# Patient Record
Sex: MALE | Race: BLACK OR AFRICAN AMERICAN | NOT HISPANIC OR LATINO | Employment: OTHER | URBAN - METROPOLITAN AREA
[De-identification: names, ages, dates, MRNs, and addresses within clinical notes are randomized per-mention and may not be internally consistent; named-entity substitution may affect disease eponyms.]

---

## 2020-03-18 ENCOUNTER — LAB VISIT (OUTPATIENT)
Dept: LAB | Facility: HOSPITAL | Age: 75
End: 2020-03-18
Attending: INTERNAL MEDICINE
Payer: MEDICARE

## 2020-03-18 DIAGNOSIS — R65.21 SPONTANEOUS ABORTION WITH SEPTIC SHOCK: ICD-10-CM

## 2020-03-18 DIAGNOSIS — J18.9 UNRESOLVED PNEUMONIA: ICD-10-CM

## 2020-03-18 PROCEDURE — U0002 COVID-19 LAB TEST NON-CDC: HCPCS

## 2020-03-20 LAB — SARS-COV-2 RNA RESP QL NAA+PROBE: NORMAL

## 2022-06-25 ENCOUNTER — OFFICE VISIT (OUTPATIENT)
Dept: URGENT CARE | Facility: CLINIC | Age: 77
End: 2022-06-25
Payer: MEDICARE

## 2022-06-25 VITALS
HEIGHT: 71 IN | DIASTOLIC BLOOD PRESSURE: 89 MMHG | HEART RATE: 71 BPM | BODY MASS INDEX: 26.6 KG/M2 | RESPIRATION RATE: 18 BRPM | WEIGHT: 190 LBS | OXYGEN SATURATION: 98 % | TEMPERATURE: 98 F | SYSTOLIC BLOOD PRESSURE: 153 MMHG

## 2022-06-25 DIAGNOSIS — U07.1 COVID-19 VIRUS DETECTED: ICD-10-CM

## 2022-06-25 DIAGNOSIS — R05.9 COUGH: Primary | ICD-10-CM

## 2022-06-25 DIAGNOSIS — U07.1 COVID-19 VIRUS INFECTION: ICD-10-CM

## 2022-06-25 LAB
CTP QC/QA: YES
SARS-COV-2 RDRP RESP QL NAA+PROBE: POSITIVE

## 2022-06-25 PROCEDURE — 1126F AMNT PAIN NOTED NONE PRSNT: CPT | Mod: CPTII,S$GLB,, | Performed by: FAMILY MEDICINE

## 2022-06-25 PROCEDURE — 3077F SYST BP >= 140 MM HG: CPT | Mod: CPTII,S$GLB,, | Performed by: FAMILY MEDICINE

## 2022-06-25 PROCEDURE — U0002 COVID-19 LAB TEST NON-CDC: HCPCS | Mod: QW,S$GLB,, | Performed by: FAMILY MEDICINE

## 2022-06-25 PROCEDURE — 3079F PR MOST RECENT DIASTOLIC BLOOD PRESSURE 80-89 MM HG: ICD-10-PCS | Mod: CPTII,S$GLB,, | Performed by: FAMILY MEDICINE

## 2022-06-25 PROCEDURE — 1159F PR MEDICATION LIST DOCUMENTED IN MEDICAL RECORD: ICD-10-PCS | Mod: CPTII,S$GLB,, | Performed by: FAMILY MEDICINE

## 2022-06-25 PROCEDURE — U0002: ICD-10-PCS | Mod: QW,S$GLB,, | Performed by: FAMILY MEDICINE

## 2022-06-25 PROCEDURE — 99214 OFFICE O/P EST MOD 30 MIN: CPT | Mod: S$GLB,,, | Performed by: FAMILY MEDICINE

## 2022-06-25 PROCEDURE — 1126F PR PAIN SEVERITY QUANTIFIED, NO PAIN PRESENT: ICD-10-PCS | Mod: CPTII,S$GLB,, | Performed by: FAMILY MEDICINE

## 2022-06-25 PROCEDURE — 1159F MED LIST DOCD IN RCRD: CPT | Mod: CPTII,S$GLB,, | Performed by: FAMILY MEDICINE

## 2022-06-25 PROCEDURE — 3079F DIAST BP 80-89 MM HG: CPT | Mod: CPTII,S$GLB,, | Performed by: FAMILY MEDICINE

## 2022-06-25 PROCEDURE — 99214 PR OFFICE/OUTPT VISIT, EST, LEVL IV, 30-39 MIN: ICD-10-PCS | Mod: S$GLB,,, | Performed by: FAMILY MEDICINE

## 2022-06-25 PROCEDURE — 3077F PR MOST RECENT SYSTOLIC BLOOD PRESSURE >= 140 MM HG: ICD-10-PCS | Mod: CPTII,S$GLB,, | Performed by: FAMILY MEDICINE

## 2022-06-25 RX ORDER — IRBESARTAN 300 MG/1
300 TABLET ORAL NIGHTLY
COMMUNITY

## 2022-06-25 RX ORDER — NAPROXEN 500 MG/1
500 TABLET ORAL 2 TIMES DAILY WITH MEALS
Qty: 20 TABLET | Refills: 0 | Status: SHIPPED | OUTPATIENT
Start: 2022-06-25

## 2022-06-25 RX ORDER — DEXTROMETHORPHAN HBR, GUAIFENESIN AND PSEUDOEPHEDRINE HCL 60; 380; 20 MG/1; MG/1; MG/1
1 TABLET ORAL EVERY 6 HOURS PRN
Qty: 20 TABLET | Refills: 0 | Status: SHIPPED | OUTPATIENT
Start: 2022-06-25

## 2022-06-25 RX ORDER — METFORMIN HYDROCHLORIDE 500 MG/1
500 TABLET ORAL 2 TIMES DAILY WITH MEALS
COMMUNITY

## 2022-06-25 RX ORDER — NAPROXEN SODIUM 220 MG/1
81 TABLET, FILM COATED ORAL DAILY
COMMUNITY

## 2022-06-25 RX ORDER — METOPROLOL TARTRATE 25 MG/1
25 TABLET, FILM COATED ORAL 2 TIMES DAILY
COMMUNITY

## 2022-06-25 RX ORDER — MULTIVIT WITH MINERALS/HERBS
1 TABLET ORAL DAILY
COMMUNITY

## 2022-06-25 RX ORDER — ATORVASTATIN CALCIUM 40 MG/1
40 TABLET, FILM COATED ORAL DAILY
COMMUNITY

## 2022-06-25 NOTE — LETTER
[address] [facility], [facility] [address] ? [place], [address] ? Phone [phone] ? Fax [phone]           Return to Work/School Status    Patient: Ramirez Cagle  YOB: 1945  Date: June 25, 2022      [facility] has adopted CDCs time-based return to work/school strategy for persons with confirmed or suspected COVID19. [facility] does not recommend using a test-based strategy for returning to work/school after COVID-19 infection. [facility] has reported prolonged positive test results without evidence of infectiousness. At this time, positive specimens capable of producing disease have not been isolated more than 9 days after onset of illness.   Symptomatic persons with confirmed COVID-19 or suspected COVID-19 can return to work/school after:    At least 1 days (24 hours) have passed since recovery defined as resolution of fever without the use of fever-reducing medications AND improvement in respiratory symptoms (e.g., cough, shortness of breath)    AND, at least 5 days have passed since symptoms first develop.  Asymptomatic persons with confirmed COVID-19 can return to work after:   At least 5 days have passed since the positive laboratory test and the person remains asymptomatic.  More information about the science behind the symptom-based return to work/school can be found at: https://www.cdc.gov/coronavirus/2019-ncov/community/asgwafrv-rrrodtdosel-lkrflurng.html    Sincerely,    Eleno Clayton MD

## 2022-06-25 NOTE — PROGRESS NOTES
"Subjective:       Patient ID: Ramirez Cagle is a 76 y.o. male.    Vitals:  height is 5' 11" (1.803 m) and weight is 86.2 kg (190 lb). His tympanic temperature is 97.7 °F (36.5 °C). His blood pressure is 153/89 (abnormal) and his pulse is 71. His respiration is 18 and oxygen saturation is 98%.     Chief Complaint: Cough (Symptoms started 3 days ago)    Cough  This is a recurrent problem. The current episode started in the past 7 days. The problem has been unchanged. The problem occurs constantly. The cough is non-productive. Nothing aggravates the symptoms. He has tried OTC cough suppressant for the symptoms. The treatment provided mild relief.       Constitution: Negative.   HENT: Negative.    Cardiovascular: Negative.    Eyes: Negative.    Respiratory: Positive for cough.    Gastrointestinal: Negative.    Endocrine: negative.   Genitourinary: Negative.    Musculoskeletal: Negative.    Skin: Negative.    Allergic/Immunologic: Negative.    Neurological: Negative.    Hematologic/Lymphatic: Negative.    Psychiatric/Behavioral: Negative.        Objective:      Physical Exam   Constitutional: He is oriented to person, place, and time. He appears well-developed. He is cooperative.  Non-toxic appearance. He does not appear ill. No distress.   HENT:   Head: Normocephalic and atraumatic.   Ears:   Right Ear: Hearing, tympanic membrane, external ear and ear canal normal.   Left Ear: Hearing, tympanic membrane, external ear and ear canal normal.   Nose: Nose normal. No mucosal edema, rhinorrhea or nasal deformity. No epistaxis. Right sinus exhibits no maxillary sinus tenderness and no frontal sinus tenderness. Left sinus exhibits no maxillary sinus tenderness and no frontal sinus tenderness.   Mouth/Throat: Uvula is midline, oropharynx is clear and moist and mucous membranes are normal. No trismus in the jaw. Normal dentition. No uvula swelling. No oropharyngeal exudate, posterior oropharyngeal edema or posterior " oropharyngeal erythema.   Eyes: Conjunctivae and lids are normal. No scleral icterus.   Neck: Trachea normal and phonation normal. Neck supple. No edema present. No erythema present. No neck rigidity present.   Cardiovascular: Normal rate, regular rhythm, normal heart sounds and normal pulses.   Pulmonary/Chest: Effort normal and breath sounds normal. No respiratory distress. He has no decreased breath sounds. He has no rhonchi.   Abdominal: Normal appearance.   Musculoskeletal: Normal range of motion.         General: No deformity. Normal range of motion.   Neurological: He is alert and oriented to person, place, and time. He exhibits normal muscle tone. Coordination normal.   Skin: Skin is warm, dry, intact, not diaphoretic and not pale.   Psychiatric: His speech is normal and behavior is normal. Judgment and thought content normal.   Nursing note and vitals reviewed.    Results for orders placed or performed in visit on 06/25/22   POCT COVID-19 Rapid Screening   Result Value Ref Range    POC Rapid COVID Positive (A) Negative     Acceptable Yes            Assessment:       1. Cough    2. COVID-19 virus infection          Plan:         Cough  -     POCT COVID-19 Rapid Screening    COVID-19 virus infection  -     pseudoephedrine-DM-guaiFENesin (POLY-VENT DM) 60- mg Tab; Take 1 tablet by mouth every 6 (six) hours as needed.  Dispense: 20 tablet; Refill: 0  -     naproxen (NAPROSYN) 500 MG tablet; Take 1 tablet (500 mg total) by mouth 2 (two) times daily with meals.  Dispense: 20 tablet; Refill: 0  -     nirmatrelvir-ritonavir 150 mg x 2- 100 mg copackaged tablets (EUA); Take 3 tablets by mouth 2 (two) times daily for 5 days. Each dose contains 2 nirmatrelvir (pink tablets) and 1 ritonavir (white tablet). Take all 3 tablets together  Dispense: 30 tablet; Refill: 0    Covid risk Score 3    Please drink plenty of fluids.  Please get plenty of rest.  Please return here or go to the Emergency  Department for any concerns or worsening of condition.    You have tested positive for COVID-19 today.  Please note that patients who test positive for COVID-19 are required by the CDC to undergo isolation for 5 days after their symptoms first began, followed by a 5 day period of strict mask wearing.  This isolation starts from the day you first developed symptoms, not the day of your positive test. For example, if your symptoms began on a Monday but tested positive on the following Wednesday, your 5-day isolation begins from that Monday, not the Wednesday you tested positive.  However, if you are asymptomatic (a person who does not have any symptoms) and COVID-19 positive, your 5-day isolation begins on the day you tested positive, regardless of exposure date.  Also, per the CDC guidelines, once your 5 days have passed, and you have not had fever greater than 100.4F in the last 24 hours without taking any fever reducers such as Tylenol (Acetaminophen) or Motrin (Ibuprofen), you may return to your normal activities including social distancing, wearing masks, and frequent handwashing - YOU DO NOT NEED ANOTHER TEST IN ORDER TO END YOUR QUARANTINE.     If you were prescribed a narcotic medication, do not drive or operate heavy equipment or machinery while taking these medications.    You were given a decongestant (RESCON or POLY VENT Dm).  If your insurance does not cover it or you cannot afford it, it is ok to use the over the counter products listed below.  If you do not have Hypertension or any history of palpitations, it is ok to take over the counter Sudafed or Mucinex D or Allegra-D or Claritin-D or Zyrtec-D.  If you do take one of the above, it is ok to combine that with plain over the counter Mucinex or Allegra or Claritin or Zyrtec.  If for example you are taking Zyrtec -D, you can combine that with Mucinex, but not Mucinex-D.  If you are taking Mucinex-D, you can combine that with plain Allegra or Claritin or  Zyrtec.   If you do have Hypertension or palpitations, it is safe to take Coricidin HBP for relief of sinus symptoms.    We recommend you take over the counter Flonase (Fluticasone) or another nasally inhaled steroid unless you are already taking one.  Nasal irrigation with a saline spray or Netti Pot like device per their directions is also recommended.  If not allergic, please take over the counter Tylenol (Acetaminophen) and/or Motrin (Ibuprofen) as directed for control of pain and/or fever.    We recommend Vitamin C (1000mg daily), Vitamin d3 (125mcg daily), and Zinc (100mg daily) to help combat the Coronavirus.    Robitussin DM 2 teas every 4 hours as needed for cough.  If you  smoke, please stop smoking.    Please follow up with your primary care doctor or specialist as needed.  Primary Doctor No  None      You must understand that you have received treatment at an Urgent Care facility only, and that you may be  released before all of your medical problems are known or treated. Urgent Care facilities are not equipped to  handle life threatening emergencies. It is recommended that you seek care at an Emergency Department for  further evaluation of worsening or concerning symptoms, or possibly life threatening conditions as  Discussed.

## 2022-06-25 NOTE — PATIENT INSTRUCTIONS
Please drink plenty of fluids.  Please get plenty of rest.  Please return here or go to the Emergency Department for any concerns or worsening of condition.    You have tested positive for COVID-19 today.  Please note that patients who test positive for COVID-19 are required by the CDC to undergo isolation for 5 days after their symptoms first began, followed by a 5 day period of strict Mask wearing.  This isolation starts from the day you first developed symptoms, not the day of your positive test. For example, if your symptoms began on a Monday but tested positive on the following Wednesday, your 5 day isolation begins from that Monday, not the Wednesday you tested positive.  However, if you are asymptomatic (a person who does not have any symptoms) and COVID-19 positive, your 5 day isolation begins on the day you tested positive, regardless of exposure date.  Also, per the CDC guidelines, once your 5 days have passed, and you have not had fever greater than 100.4F in the last 24 hours without taking any fever reducers such as Tylenol (Acetaminophen) or Motrin (Ibuprofen), you may return to your normal activities including social distancing, wearing masks, and frequent handwashing - YOU DO NOT NEED ANOTHER TEST IN ORDER TO END YOUR QUARANTINE.     If you were prescribed a narcotic medication, do not drive or operate heavy equipment or machinery while taking these medications.    You were given a decongestant (RESCON or POLY VENT Dm).  If your insurance does not cover it or you cannot afford it, it is ok to use the over the counter products listed below.  If you do not have Hypertension or any history of palpitations, it is ok to take over the counter Sudafed or Mucinex D or Allegra-D or Claritin-D or Zyrtec-D.  If you do take one of the above, it is ok to combine that with plain over the counter Mucinex or Allegra or Claritin or Zyrtec.  If for example you are taking Zyrtec -D, you can combine that with Mucinex,  but not Mucinex-D.  If you are taking Mucinex-D, you can combine that with plain Allegra or Claritin or Zyrtec.   If you do have Hypertension or palpitations, it is safe to take Coricidin HBP for relief of sinus symptoms.    We recommend you take over the counter Flonase (Fluticasone) or another nasally inhaled steroid unless you are already taking one.  Nasal irrigation with a saline spray or Netti Pot like device per their directions is also recommended.  If not allergic, please take over the counter Tylenol (Acetaminophen) and/or Motrin (Ibuprofen) as directed for control of pain and/or fever.    We recommend Vitamin C (1000mg daily), Vitamin d3 (125mcg daily), and Zinc (100mg daily) to help combat the Coronavirus.    Robitussin DM 2 teas every 4 hours as needed for cough.  If you  smoke, please stop smoking.    Please follow up with your primary care doctor or specialist as needed.  Primary Doctor No  None    Covid risk Score 3    You must understand that you have received treatment at an Urgent Care facility only, and that you may be  released before all of your medical problems are known or treated. Urgent Care facilities are not equipped to  handle life threatening emergencies. It is recommended that you seek care at an Emergency Department for  further evaluation of worsening or concerning symptoms, or possibly life threatening conditions as  Discussed.    Instructions for Patients with Confirmed or Suspected COVID-19    If you are awaiting your test result, you will either be called or it will be released to the patient portal.  If you have any questions about your test, please visit [email or web address] or call our COVID-19 information line at [phone].      Instructions for non-hospitalized or discharged patients with confirmed or suspected COVID-19:      Stay home except to get medical care.   Separate yourself from other people and animals in your home.   Call ahead before visiting your  doctor.   Wear a face mask.   Cover your coughs and sneezes.   Clean your hands often.   Avoid sharing personal household items.   Clean all high-touch surfaces every day.   Monitor your symptoms. Seek prompt medical attention if your illness is worsening (e.g., difficulty breathing). Before seeking care, call your healthcare provider.   If you have a medical emergency and must call 911, notify the dispatcher that you have or are being evaluated for COVID-19. If possible, put on a face mask before emergency medical services arrive.   Use the following symptom-based strategy to return to normal activity following a suspected or confirmed case of COVID-19. Continue isolation until:   At least 1 days (24 hours) have passed since recovery defined as resolution of fever without the use of fever-reducing medications and improvement in respiratory symptoms (e.g. cough, shortness of breath), and   At least 5 days have passed since the first positive test.       As one of the next steps, you will receive a call or text from the [place] Department of Health ([facility]) COVID-19 Tracing Team. See the contact information below so you know not to ignore the health departments call. It is important that you contact them back immediately so they can help.     Contact Tracer Number:  [phone]  Caller ID for most carriers: [place] Dept Health    What is contact tracing?  Contact tracing is a process that helps identify everyone who has been in close contact with an infected person. Contact tracers let those people know they may have been exposed and guide them on next steps. Confidentiality is important for everyone; no one will be told who may have exposed them to the virus.  Your involvement is important. The more we know about where and how this virus is spreading, the better chance we have at stopping it from spreading further.  What does exposure mean?  Exposure means you have been within 6 feet for more than 15 minutes with a  person who has or had COVID-19.  What kind of questions do the contact tracers ask?  A contact tracer will confirm your basic contact information including name, address, phone number, and next of kin, as well as asking about any symptoms you may have had. Theyll also ask you how you think you may have gotten sick, such as places where you may have been exposed to the virus, and people you were with. Those names will never be shared with anyone outside of that call, and will only be used to help trace and stop the spread of the virus.   I have privacy concerns. How will the state use my information?  Your privacy about your health is important. All calls are completed using call centers that use the appropriate health privacy protection measures (HIPAA compliance), meaning that your patient information is safe. No one will ever ask you any questions related to immigration status. Your health comes first.   Do I have to participate?  You do not have to participate, but we strongly encourage you to. Contact tracing can help us catch and control new outbreaks as theyre developing to keep your friends and family safe.   What if I dont hear from anyone?  If you dont receive a call within 24 hours, you can call the number above right away to inquire about your status. That line is open from 8:00 am - 8:00 p.m., 7 days a week.  Contact tracing saves lives! Together, we have the power to beat this virus and keep our loved ones and neighbors safe.       Instructions for household members, intimate partners and caregivers in a non-healthcare setting of a patient with confirmed or suspected COVID-19:        Close contacts should monitor their health and call their healthcare provider right away if they develop symptoms suggestive of COVID-19 (e.g., fever, cough, shortness of breath).   Stay home except to get medical care. Separate yourself from other people and animals in the home.  Monitor the patients symptoms. If the  patient is getting sicker, call his or her healthcare provider. If the patient has a medical emergency and you need to call 911, notify the dispatch personnel that the patient has or is being evaluated for COVID-19.   Wear a facemask when around other people such as sharing a room or vehicle and before entering a healthcare provider's office.  Cover coughs and sneezes with a tissue. Throw used tissues in a lined trash can immediately and wash hands.  Clean hands often with soap and water for at least 20 seconds or with an alcohol-based hand , rubbing hands together until they feel dry. Avoid touching your eyes, nose, and mouth with unwashed hands.  Clean all high-touch; surfaces every day, including counters, tabletops, doorknobs, bathroom fixtures, toilets, phones, keyboards, tablets, bedside tables, etc. Use a household cleaning spray or wipe according to label instructions.  Avoid sharing personal household items such as dishes, drinking glasses, cups, towels, bedding, etc. After these items are used, they should be washed thoroughly with soap and water.  Continue isolation until:  At least 1 days (72 hours) have passed since recovery defined as resolution of fever without the use of fever-reducing medications and improvement in respiratory symptoms (e.g. cough, shortness of breath), and   At least 5 days have passed since the patients first positive test.    https://www.cdc.gov/coronavirus/2019-ncov/your-health/index.htm

## 2022-06-29 ENCOUNTER — TELEPHONE (OUTPATIENT)
Dept: URGENT CARE | Facility: CLINIC | Age: 77
End: 2022-06-29
Payer: MEDICARE

## 2022-06-29 NOTE — TELEPHONE ENCOUNTER
Called back patient for follow up visit 4 days ago for Covid infection.  Patient states he is feeling better, symptoms improving with treatment.  Recommended continuing medications as needed and finish isolation period.  Follow up with us if symptoms persist or worsen.

## 2022-11-29 ENCOUNTER — CLINICAL SUPPORT (OUTPATIENT)
Dept: CARDIOLOGY | Facility: HOSPITAL | Age: 77
End: 2022-11-29
Attending: INTERNAL MEDICINE
Payer: MEDICARE

## 2022-11-29 VITALS
DIASTOLIC BLOOD PRESSURE: 89 MMHG | BODY MASS INDEX: 25.77 KG/M2 | WEIGHT: 180 LBS | HEIGHT: 70 IN | SYSTOLIC BLOOD PRESSURE: 153 MMHG

## 2022-11-29 DIAGNOSIS — I50.22 CHRONIC SYSTOLIC HEART FAILURE: ICD-10-CM

## 2022-11-29 LAB
AORTIC ROOT ANNULUS: 4.52 CM
AORTIC VALVE CUSP SEPERATION: 1.46 CM
ASCENDING AORTA: 4.1 CM
AV INDEX (PROSTH): 0.76
AV MEAN GRADIENT: 2 MMHG
AV PEAK GRADIENT: 4 MMHG
AV VALVE AREA: 2.96 CM2
AV VELOCITY RATIO: 0.67
BSA FOR ECHO PROCEDURE: 2.01 M2
CV ECHO LV RWT: 0.39 CM
DOP CALC AO PEAK VEL: 1.05 M/S
DOP CALC AO VTI: 23.7 CM
DOP CALC LVOT AREA: 3.9 CM2
DOP CALC LVOT DIAMETER: 2.23 CM
DOP CALC LVOT PEAK VEL: 0.7 M/S
DOP CALC LVOT STROKE VOLUME: 70.27 CM3
DOP CALCLVOT PEAK VEL VTI: 18 CM
E WAVE DECELERATION TIME: 318.59 MSEC
E/A RATIO: 3.19
ECHO LV POSTERIOR WALL: 1.04 CM (ref 0.6–1.1)
EJECTION FRACTION: 50 %
FRACTIONAL SHORTENING: 26 % (ref 28–44)
INTERVENTRICULAR SEPTUM: 1.72 CM (ref 0.6–1.1)
IVC DIAMETER: 1.95 CM
IVRT: 110.73 MSEC
LA MAJOR: 6.32 CM
LEFT ATRIUM SIZE: 4.4 CM
LEFT INTERNAL DIMENSION IN SYSTOLE: 3.99 CM (ref 2.1–4)
LEFT VENTRICLE DIASTOLIC VOLUME INDEX: 69.35 ML/M2
LEFT VENTRICLE DIASTOLIC VOLUME: 138.69 ML
LEFT VENTRICLE MASS INDEX: 159 G/M2
LEFT VENTRICLE SYSTOLIC VOLUME INDEX: 34.8 ML/M2
LEFT VENTRICLE SYSTOLIC VOLUME: 69.63 ML
LEFT VENTRICULAR INTERNAL DIMENSION IN DIASTOLE: 5.36 CM (ref 3.5–6)
LEFT VENTRICULAR MASS: 317.92 G
LVOT MG: 0.85 MMHG
LVOT MV: 0.41 CM/S
MV PEAK A VEL: 0.32 M/S
MV PEAK E VEL: 1.02 M/S
MV STENOSIS PRESSURE HALF TIME: 92.39 MS
MV VALVE AREA P 1/2 METHOD: 2.38 CM2
PISA TR MAX VEL: 3.01 M/S
PV MV: 0.49 M/S
PV PEAK VELOCITY: 0.76 CM/S
RA MAJOR: 5.67 CM
RA PRESSURE: 3 MMHG
RA WIDTH: 5.2 CM
RIGHT VENTRICULAR END-DIASTOLIC DIMENSION: 3.01 CM
TR MAX PG: 36 MMHG
TV PEAK E VEL: 0.7 M/S
TV REST PULMONARY ARTERY PRESSURE: 39 MMHG

## 2022-11-29 PROCEDURE — 93306 TTE W/DOPPLER COMPLETE: CPT

## 2023-01-18 ENCOUNTER — HOSPITAL ENCOUNTER (OUTPATIENT)
Facility: HOSPITAL | Age: 78
Discharge: HOME | End: 2023-01-21
Attending: EMERGENCY MEDICINE | Admitting: INTERNAL MEDICINE
Payer: MEDICARE

## 2023-01-18 DIAGNOSIS — I10 PRIMARY HYPERTENSION: ICD-10-CM

## 2023-01-18 DIAGNOSIS — N39.0 URINARY TRACT INFECTION WITHOUT HEMATURIA, SITE UNSPECIFIED: ICD-10-CM

## 2023-01-18 DIAGNOSIS — G93.41 METABOLIC ENCEPHALOPATHY: Primary | ICD-10-CM

## 2023-01-18 DIAGNOSIS — I10 MALIGNANT HYPERTENSION: ICD-10-CM

## 2023-01-18 PROBLEM — G93.40 ACUTE ENCEPHALOPATHY: Status: ACTIVE | Noted: 2023-01-18

## 2023-01-18 LAB
ABO + RH BLD: NORMAL
ALBUMIN SERPL BCP-MCNC: 3.3 G/DL (ref 3.5–5.2)
ALP SERPL-CCNC: 61 U/L (ref 55–135)
ALT SERPL W/O P-5'-P-CCNC: 30 U/L (ref 10–44)
AMPHET+METHAMPHET UR QL: NEGATIVE
ANION GAP SERPL CALC-SCNC: 4 MMOL/L (ref 8–16)
AST SERPL-CCNC: 32 U/L (ref 10–40)
BACTERIA #/AREA URNS HPF: ABNORMAL /HPF
BARBITURATES UR QL SCN>200 NG/ML: NEGATIVE
BASOPHILS # BLD AUTO: 0.01 K/UL (ref 0–0.2)
BASOPHILS NFR BLD: 0.1 % (ref 0–1.9)
BENZODIAZ UR QL SCN>200 NG/ML: NEGATIVE
BILIRUB SERPL-MCNC: 0.7 MG/DL (ref 0.1–1)
BILIRUB UR QL STRIP: NEGATIVE
BLD GP AB SCN CELLS X3 SERPL QL: NORMAL
BUN SERPL-MCNC: 19 MG/DL (ref 8–23)
BZE UR QL SCN: NEGATIVE
CALCIUM SERPL-MCNC: 7.9 MG/DL (ref 8.7–10.5)
CANNABINOIDS UR QL SCN: NEGATIVE
CHLORIDE SERPL-SCNC: 107 MMOL/L (ref 95–110)
CHOLEST SERPL-MCNC: 117 MG/DL (ref 120–199)
CHOLEST/HDLC SERPL: 2.9 {RATIO} (ref 2–5)
CLARITY UR: CLEAR
CO2 SERPL-SCNC: 29 MMOL/L (ref 23–29)
COLOR UR: YELLOW
CREAT SERPL-MCNC: 1.6 MG/DL (ref 0.5–1.4)
CREAT UR-MCNC: 60.9 MG/DL (ref 23–375)
CTP QC/QA: YES
DIFFERENTIAL METHOD: ABNORMAL
EOSINOPHIL # BLD AUTO: 0.1 K/UL (ref 0–0.5)
EOSINOPHIL NFR BLD: 1.1 % (ref 0–8)
ERYTHROCYTE [DISTWIDTH] IN BLOOD BY AUTOMATED COUNT: 14.7 % (ref 11.5–14.5)
EST. GFR  (NO RACE VARIABLE): 44.4 ML/MIN/1.73 M^2
ESTIMATED AVG GLUCOSE: 146 MG/DL (ref 68–131)
ETHANOL SERPL-MCNC: <3 MG/DL
GLUCOSE SERPL-MCNC: 197 MG/DL (ref 70–110)
GLUCOSE UR QL STRIP: ABNORMAL
HBA1C MFR BLD: 6.7 % (ref 4–5.6)
HCT VFR BLD AUTO: 34.9 % (ref 40–54)
HDLC SERPL-MCNC: 40 MG/DL (ref 40–75)
HDLC SERPL: 34.2 % (ref 20–50)
HGB BLD-MCNC: 11.4 G/DL (ref 14–18)
HGB UR QL STRIP: ABNORMAL
HYALINE CASTS #/AREA URNS LPF: 0.8 /LPF
IMM GRANULOCYTES # BLD AUTO: 0.02 K/UL (ref 0–0.04)
IMM GRANULOCYTES NFR BLD AUTO: 0.3 % (ref 0–0.5)
INR PPP: 1.2 (ref 0.8–1.2)
KETONES UR QL STRIP: NEGATIVE
LDLC SERPL CALC-MCNC: 67.2 MG/DL (ref 63–159)
LEUKOCYTE ESTERASE UR QL STRIP: NEGATIVE
LYMPHOCYTES # BLD AUTO: 1.1 K/UL (ref 1–4.8)
LYMPHOCYTES NFR BLD: 16 % (ref 18–48)
MCH RBC QN AUTO: 30.5 PG (ref 27–31)
MCHC RBC AUTO-ENTMCNC: 32.7 G/DL (ref 32–36)
MCV RBC AUTO: 93 FL (ref 82–98)
METHADONE UR QL SCN>300 NG/ML: NEGATIVE
MICROSCOPIC COMMENT: ABNORMAL
MONOCYTES # BLD AUTO: 0.5 K/UL (ref 0.3–1)
MONOCYTES NFR BLD: 7.1 % (ref 4–15)
NEUTROPHILS # BLD AUTO: 5.3 K/UL (ref 1.8–7.7)
NEUTROPHILS NFR BLD: 75.4 % (ref 38–73)
NITRITE UR QL STRIP: NEGATIVE
NONHDLC SERPL-MCNC: 77 MG/DL
NRBC BLD-RTO: 0 /100 WBC
OPIATES UR QL SCN: NEGATIVE
PCP UR QL SCN>25 NG/ML: NEGATIVE
PH UR STRIP: 7 [PH] (ref 5–8)
PLATELET # BLD AUTO: 123 K/UL (ref 150–450)
PMV BLD AUTO: 11.6 FL (ref 9.2–12.9)
POCT GLUCOSE: 152 MG/DL (ref 70–110)
POCT GLUCOSE: 169 MG/DL (ref 70–110)
POCT GLUCOSE: 195 MG/DL (ref 70–110)
POTASSIUM SERPL-SCNC: 4 MMOL/L (ref 3.5–5.1)
PROT SERPL-MCNC: 7.5 G/DL (ref 6–8.4)
PROT UR QL STRIP: ABNORMAL
PROTHROMBIN TIME: 12.9 SEC (ref 9–12.5)
RBC # BLD AUTO: 3.74 M/UL (ref 4.6–6.2)
RBC #/AREA URNS HPF: 5 /HPF (ref 0–4)
SARS-COV-2 RDRP RESP QL NAA+PROBE: NEGATIVE
SODIUM SERPL-SCNC: 140 MMOL/L (ref 136–145)
SP GR UR STRIP: 1.01 (ref 1–1.03)
SQUAMOUS #/AREA URNS HPF: 1 /HPF
TOXICOLOGY INFORMATION: NORMAL
TRIGL SERPL-MCNC: 49 MG/DL (ref 30–150)
TSH SERPL DL<=0.005 MIU/L-ACNC: 3.39 UIU/ML (ref 0.4–4)
URN SPEC COLLECT METH UR: ABNORMAL
UROBILINOGEN UR STRIP-ACNC: NEGATIVE EU/DL
WBC # BLD AUTO: 7.01 K/UL (ref 3.9–12.7)
WBC #/AREA URNS HPF: 9 /HPF (ref 0–5)

## 2023-01-18 PROCEDURE — 82077 ASSAY SPEC XCP UR&BREATH IA: CPT | Performed by: EMERGENCY MEDICINE

## 2023-01-18 PROCEDURE — 25000003 PHARM REV CODE 250: Performed by: EMERGENCY MEDICINE

## 2023-01-18 PROCEDURE — 85025 COMPLETE CBC W/AUTO DIFF WBC: CPT | Performed by: EMERGENCY MEDICINE

## 2023-01-18 PROCEDURE — 87635 SARS-COV-2 COVID-19 AMP PRB: CPT | Performed by: EMERGENCY MEDICINE

## 2023-01-18 PROCEDURE — 63600175 PHARM REV CODE 636 W HCPCS: Performed by: EMERGENCY MEDICINE

## 2023-01-18 PROCEDURE — G0426 INPT/ED TELECONSULT50: HCPCS | Mod: 95,,, | Performed by: PSYCHIATRY & NEUROLOGY

## 2023-01-18 PROCEDURE — 96365 THER/PROPH/DIAG IV INF INIT: CPT

## 2023-01-18 PROCEDURE — 84443 ASSAY THYROID STIM HORMONE: CPT | Performed by: EMERGENCY MEDICINE

## 2023-01-18 PROCEDURE — 94761 N-INVAS EAR/PLS OXIMETRY MLT: CPT

## 2023-01-18 PROCEDURE — G0378 HOSPITAL OBSERVATION PER HR: HCPCS

## 2023-01-18 PROCEDURE — 86900 BLOOD TYPING SEROLOGIC ABO: CPT | Performed by: EMERGENCY MEDICINE

## 2023-01-18 PROCEDURE — 99285 EMERGENCY DEPT VISIT HI MDM: CPT | Mod: 25

## 2023-01-18 PROCEDURE — 85610 PROTHROMBIN TIME: CPT | Performed by: EMERGENCY MEDICINE

## 2023-01-18 PROCEDURE — 27000221 HC OXYGEN, UP TO 24 HOURS

## 2023-01-18 PROCEDURE — 80061 LIPID PANEL: CPT | Performed by: EMERGENCY MEDICINE

## 2023-01-18 PROCEDURE — G0378 HOSPITAL OBSERVATION PER HR: HCPCS | Mod: OBSCO

## 2023-01-18 PROCEDURE — 93010 EKG 12-LEAD: ICD-10-PCS | Mod: ,,, | Performed by: INTERNAL MEDICINE

## 2023-01-18 PROCEDURE — 99900035 HC TECH TIME PER 15 MIN (STAT)

## 2023-01-18 PROCEDURE — 99900031 HC PATIENT EDUCATION (STAT)

## 2023-01-18 PROCEDURE — 81000 URINALYSIS NONAUTO W/SCOPE: CPT | Mod: 59 | Performed by: EMERGENCY MEDICINE

## 2023-01-18 PROCEDURE — 93010 ELECTROCARDIOGRAM REPORT: CPT | Mod: ,,, | Performed by: INTERNAL MEDICINE

## 2023-01-18 PROCEDURE — 36415 COLL VENOUS BLD VENIPUNCTURE: CPT | Performed by: EMERGENCY MEDICINE

## 2023-01-18 PROCEDURE — 80307 DRUG TEST PRSMV CHEM ANLYZR: CPT | Performed by: EMERGENCY MEDICINE

## 2023-01-18 PROCEDURE — 93005 ELECTROCARDIOGRAM TRACING: CPT

## 2023-01-18 PROCEDURE — 96360 HYDRATION IV INFUSION INIT: CPT | Mod: 59

## 2023-01-18 PROCEDURE — 82962 GLUCOSE BLOOD TEST: CPT

## 2023-01-18 PROCEDURE — 80053 COMPREHEN METABOLIC PANEL: CPT | Performed by: EMERGENCY MEDICINE

## 2023-01-18 PROCEDURE — 25500020 PHARM REV CODE 255: Performed by: EMERGENCY MEDICINE

## 2023-01-18 PROCEDURE — G0426 PR INPT TELEHEALTH CONSULT 50M: ICD-10-PCS | Mod: 95,,, | Performed by: PSYCHIATRY & NEUROLOGY

## 2023-01-18 PROCEDURE — 83036 HEMOGLOBIN GLYCOSYLATED A1C: CPT | Performed by: EMERGENCY MEDICINE

## 2023-01-18 PROCEDURE — 96367 TX/PROPH/DG ADDL SEQ IV INF: CPT

## 2023-01-18 RX ORDER — IBUPROFEN 200 MG
24 TABLET ORAL
Status: DISCONTINUED | OUTPATIENT
Start: 2023-01-18 | End: 2023-01-21 | Stop reason: HOSPADM

## 2023-01-18 RX ORDER — ONDANSETRON 2 MG/ML
4 INJECTION INTRAMUSCULAR; INTRAVENOUS EVERY 8 HOURS PRN
Status: DISCONTINUED | OUTPATIENT
Start: 2023-01-18 | End: 2023-01-21 | Stop reason: HOSPADM

## 2023-01-18 RX ORDER — GLUCAGON 1 MG
1 KIT INJECTION
Status: DISCONTINUED | OUTPATIENT
Start: 2023-01-18 | End: 2023-01-21 | Stop reason: HOSPADM

## 2023-01-18 RX ORDER — SODIUM CHLORIDE 0.9 % (FLUSH) 0.9 %
10 SYRINGE (ML) INJECTION
Status: DISCONTINUED | OUTPATIENT
Start: 2023-01-18 | End: 2023-01-21 | Stop reason: HOSPADM

## 2023-01-18 RX ORDER — INSULIN ASPART 100 [IU]/ML
0-5 INJECTION, SOLUTION INTRAVENOUS; SUBCUTANEOUS
Status: DISCONTINUED | OUTPATIENT
Start: 2023-01-18 | End: 2023-01-21 | Stop reason: HOSPADM

## 2023-01-18 RX ORDER — LOSARTAN POTASSIUM 50 MG/1
100 TABLET ORAL DAILY
Status: DISCONTINUED | OUTPATIENT
Start: 2023-01-19 | End: 2023-01-21 | Stop reason: HOSPADM

## 2023-01-18 RX ORDER — METOPROLOL TARTRATE 25 MG/1
25 TABLET, FILM COATED ORAL 2 TIMES DAILY
Status: DISCONTINUED | OUTPATIENT
Start: 2023-01-18 | End: 2023-01-21 | Stop reason: HOSPADM

## 2023-01-18 RX ORDER — ACETAMINOPHEN 325 MG/1
650 TABLET ORAL EVERY 8 HOURS PRN
Status: DISCONTINUED | OUTPATIENT
Start: 2023-01-18 | End: 2023-01-21 | Stop reason: HOSPADM

## 2023-01-18 RX ORDER — TALC
6 POWDER (GRAM) TOPICAL NIGHTLY PRN
Status: DISCONTINUED | OUTPATIENT
Start: 2023-01-18 | End: 2023-01-21 | Stop reason: HOSPADM

## 2023-01-18 RX ORDER — NICARDIPINE HYDROCHLORIDE 0.2 MG/ML
0-15 INJECTION INTRAVENOUS CONTINUOUS
Status: DISCONTINUED | OUTPATIENT
Start: 2023-01-18 | End: 2023-01-18

## 2023-01-18 RX ORDER — IBUPROFEN 200 MG
16 TABLET ORAL
Status: DISCONTINUED | OUTPATIENT
Start: 2023-01-18 | End: 2023-01-21 | Stop reason: HOSPADM

## 2023-01-18 RX ORDER — SODIUM CHLORIDE 9 MG/ML
INJECTION, SOLUTION INTRAVENOUS
Status: COMPLETED | OUTPATIENT
Start: 2023-01-18 | End: 2023-01-18

## 2023-01-18 RX ADMIN — NICARDIPINE HYDROCHLORIDE 5 MG/HR: 0.2 INJECTION, SOLUTION INTRAVENOUS at 02:01

## 2023-01-18 RX ADMIN — SODIUM CHLORIDE: 9 INJECTION, SOLUTION INTRAVENOUS at 05:01

## 2023-01-18 RX ADMIN — IOHEXOL 75 ML: 350 INJECTION, SOLUTION INTRAVENOUS at 02:01

## 2023-01-18 RX ADMIN — CEFTRIAXONE SODIUM 1 G: 1 INJECTION, POWDER, FOR SOLUTION INTRAMUSCULAR; INTRAVENOUS at 03:01

## 2023-01-18 RX ADMIN — METOPROLOL TARTRATE 25 MG: 25 TABLET, FILM COATED ORAL at 09:01

## 2023-01-18 RX ADMIN — SODIUM CHLORIDE 1000 ML: 9 INJECTION, SOLUTION INTRAVENOUS at 03:01

## 2023-01-18 NOTE — CONSULTS
[facility]  Vascular Neurology  Comprehensive Stroke Center  TeleVascular Neurology Acute Consultation Note      Consult to Telemedicine - Acute Stroke  Consult performed by: Bong Iverson MD  Consult ordered by: Ab Mccann MD        Consulting Provider: AB MCCANN.  Current Providers  No providers found    Patient Location:  [facility] EMERGENCY DEPARTMENT Emergency Department  [facility] hospital nurse at bedside with patient assisting consultant.     Patient information was obtained from patient.         Assessment/Plan:       Diagnoses:   Acute encephalopathy  Global confusion and weakness, reduced alertness.  No focal findings on exam, low suspicion for brainstem infarct however plan on CTA H&N before treatment as HTN emergency.  No hemorrhage on CT scan.   OK at this time to continue apixaban for secondary stroke prevention.        STROKE DOCUMENTATION     Acute Stroke Times:   Acute Stroke Times   Last Known Normal Time: 1100  Stroke Team Called Time: 1411  Stroke Team Arrival Time: 1421  Thrombolytic Therapy Recommended: No    NIH Scale:  1a. Level of Consciousness: 1-->Not alert, but arousable by minor stimulation to obey, answer, or respond  1b. LOC Questions: 0-->Answers both questions correctly  1c. LOC Commands: 0-->Performs both tasks correctly  2. Best Gaze: 0-->Normal  3. Visual: 0-->No visual loss  4. Facial Palsy: 0-->Normal symmetrical movements  5a. Motor Arm, Left: 0-->No drift, limb holds 90 (or 45) degrees for full 10 secs  5b. Motor Arm, Right: 0-->No drift, limb holds 90 (or 45) degrees for full 10 secs  6a. Motor Leg, Left: 0-->No drift, leg holds 30 degree position for full 5 secs  6b. Motor Leg, Right: 0-->No drift, leg holds 30 degree position for full 5 secs  7. Limb Ataxia: 0-->Absent  8. Sensory: 0-->Normal, no sensory loss  9. Best Language: 0-->No aphasia, normal  10. Dysarthria: 0-->Normal  11. Extinction and Inattention (formerly  "Neglect): 0-->No abnormality  Total (NIH Stroke Scale): 1     Modified Terry    [place] Coma Scale:    ABCD2 Score:    [id number] Score:   HAS -BLED Score:   ICH Score:   Hunt & Lambert Classification:       Blood pressure (!) 230/108, pulse (!) 44, resp. rate 16, height 5' 10" (1.778 m), weight 90.5 kg (199 lb 8 oz), SpO2 95 %.  Eligible for thrombolytic therapy?: No  Thrombolytic therapy recomended: Thrombolytic therapy not recommended due to Full dose anticoagulation   Possible Interventional Revascularization Candidate? Awaiting CTA results from [facility] for determination  and No; at this time symptoms not suggestive of large vessel occlusion    Disposition Recommendation: pending further studies    Subjective:     History of Present Illness:  77M w/ general fatigue, reduced consciousness, no focal deficits appreciated.  /108 mmHg, cardene gtt has been started by ED physician for HTN emergency      Woke up with symptoms?: no    Recent bleeding noted: no  Does the patient take any Blood Thinners? yes  Medications: Anticoagulants:  apixaban/Eliquis      Past Medical History: diabetes and stroke    Past Surgical History: no major surgeries within the last 2 weeks    Family History: no relevant history    Social History: no smoking, no drinking, no drugs    Allergies: No Known Allergies No relevant allergies    Review of Systems   Constitutional: Negative for appetite change, chills and fever.   HENT: Negative for congestion and sore throat.    Eyes: Negative for discharge and itching.   Respiratory: Negative for apnea and shortness of breath.    Cardiovascular: Negative for chest pain and palpitations.   Gastrointestinal: Negative for abdominal pain and anal bleeding.   Endocrine: Negative for cold intolerance and polydipsia.   Genitourinary: Negative for dysuria and hematuria.   Musculoskeletal: Negative for joint swelling and myalgias.   Skin: Negative for color change and rash.   Neurological: Negative for " "tremors.   Psychiatric/Behavioral: Negative for hallucinations and self-injury.     Objective:   Vitals: Blood pressure (!) 230/108, pulse (!) 44, resp. rate 16, height 5' 10" (1.778 m), weight 90.5 kg (199 lb 8 oz), SpO2 95 %.     CT READ: Yes  No hemmorhage. No mass effect. No early infarct signs.  Remote L MCA stroke    Physical Exam  Constitutional:       General: He is not in acute distress.  HENT:      Head: Atraumatic.      Right Ear: External ear normal.      Left Ear: External ear normal.   Eyes:      General: No scleral icterus.     Conjunctiva/sclera: Conjunctivae normal.   Pulmonary:      Effort: Pulmonary effort is normal.   Abdominal:      General: There is no distension.      Tenderness: There is no guarding.   Musculoskeletal:         General: No deformity. Normal range of motion.      Cervical back: Normal range of motion.   Skin:     General: Skin is warm and dry.   Neurological:      Mental Status: He is alert.             Recommended the emergency room physician to have a brief discussion with the patient and/or family if available regarding the  risks and benefits of treatment, and to briefly document the occurrence of that discussion in his clinical encounter note.     The attending portion of this evaluation, treatment, and documentation was performed per Bong Iverson MD via audiovisual.    Billing code:  (non-intervention mild to moderate stroke, TIA, some mimics)        There is a moderate probability for acute neurological change leading to clinical and possibly life-threatening deterioration requiring highest level of physician preparedness for urgent intervention.  Care was coordinated with other physicians involved in the patient's care.  Radiologic studies and laboratory data were reviewed and interpreted, and plan of care was re-assessed based on the results.  Diagnosis, treatment options and prognosis may have been discussed with the patient and/or family members or " caregiver.    In your opinion, this was a: Tier 1 Van Negative    Consult End Time: 2:34 PM     Bong Iverson MD  [facility]  Vascular Neurology   [facility]

## 2023-01-18 NOTE — HPI
77M w/ general fatigue, reduced consciousness, no focal deficits appreciated.  /108 mmHg, cardene gtt has been started by ED physician for HTN emergency

## 2023-01-18 NOTE — ASSESSMENT & PLAN NOTE
Global confusion and weakness, reduced alertness.  No focal findings on exam, low suspicion for brainstem infarct however plan on CTA H&N before treatment as HTN emergency.  No hemorrhage on CT scan.   OK at this time to continue apixaban for secondary stroke prevention.

## 2023-01-18 NOTE — SUBJECTIVE & OBJECTIVE
"  Woke up with symptoms?: no    Recent bleeding noted: no  Does the patient take any Blood Thinners? yes  Medications: Anticoagulants:  apixaban/Eliquis      Past Medical History: diabetes and stroke    Past Surgical History: no major surgeries within the last 2 weeks    Family History: no relevant history    Social History: no smoking, no drinking, no drugs    Allergies: No Known Allergies No relevant allergies    Review of Systems   Constitutional: Negative for appetite change, chills and fever.   HENT: Negative for congestion and sore throat.    Eyes: Negative for discharge and itching.   Respiratory: Negative for apnea and shortness of breath.    Cardiovascular: Negative for chest pain and palpitations.   Gastrointestinal: Negative for abdominal pain and anal bleeding.   Endocrine: Negative for cold intolerance and polydipsia.   Genitourinary: Negative for dysuria and hematuria.   Musculoskeletal: Negative for joint swelling and myalgias.   Skin: Negative for color change and rash.   Neurological: Negative for tremors.   Psychiatric/Behavioral: Negative for hallucinations and self-injury.     Objective:   Vitals: Blood pressure (!) 230/108, pulse (!) 44, resp. rate 16, height 5' 10" (1.778 m), weight 90.5 kg (199 lb 8 oz), SpO2 95 %.     CT READ: Yes  No hemmorhage. No mass effect. No early infarct signs.  Remote L MCA stroke    Physical Exam  Constitutional:       General: He is not in acute distress.  HENT:      Head: Atraumatic.      Right Ear: External ear normal.      Left Ear: External ear normal.   Eyes:      General: No scleral icterus.     Conjunctiva/sclera: Conjunctivae normal.   Pulmonary:      Effort: Pulmonary effort is normal.   Abdominal:      General: There is no distension.      Tenderness: There is no guarding.   Musculoskeletal:         General: No deformity. Normal range of motion.      Cervical back: Normal range of motion.   Skin:     General: Skin is warm and dry.   Neurological:      " Mental Status: He is alert.

## 2023-01-18 NOTE — ED PROVIDER NOTES
EMERGENCY DEPARTMENT HISTORY AND PHYSICAL EXAM     This note is dictated on M*Modal word recognition program.  There are word recognition mistakes and grammatical errors that are occasionally missed on review.     Date: 1/18/2023   Patient Name: Ramirez Cagle       History of Presenting Illness      Chief Complaint   Patient presents with    Fatigue     EMS states pt was outside with thick clothing around 10:30 to 11 LKW, family states when he returned into the home he was stating he felt weak, overheated, and was moaning. Pt presents to the ED oriented x4 but lethargic. No deficits.         1358   Ramirez Cagle is a 77 y.o. male with PMHX of hypertension, diabetes who presents to the emergency department C/O altered mental status.    Patient arrives by EMS after family called because patient was feeling weak.  They state that patient typically goes out in the morning and drinks coffee all day.  He was last known well around 10:30 a.m..  Patient came in around 1:00 p.m. and told family members he was feeling weak.  He reportedly was wearing heavy clothes and felt hot and overheated.  EMS was called and patient arrives here at 1:50 p.m..    In the ED patient is awake but with diminished mentation.  Follows commands.  Does not have focal neurological deficit      Field cbg normal.     PCP: Primary Doctor No        Current Facility-Administered Medications   Medication Dose Route Frequency Provider Last Rate Last Admin    [START ON 1/19/2023] cefTRIAXone (ROCEPHIN) 1 g in dextrose 5 % in water (D5W) 5 % 50 mL IVPB (MB+)  1 g Intravenous Q24H Lance Hutchison MD        dextrose 50% injection 12.5 g  12.5 g Intravenous PRN Lance Hutchison MD        dextrose 50% injection 25 g  25 g Intravenous PRN Lance Hutchison MD        glucagon (human recombinant) injection 1 mg  1 mg Intramuscular PRN Lance Hutchison MD        glucose chewable tablet 16 g  16 g Oral PRN Lance Hutchison MD         glucose chewable tablet 24 g  24 g Oral PRN Lance Hutchison MD        insulin aspart U-100 pen 0-5 Units  0-5 Units Subcutaneous QID (AC + HS) PRN Lance Hutchison MD        [START ON 1/19/2023] losartan tablet 100 mg  100 mg Oral Daily Lance Hutchison MD        metoprolol tartrate (LOPRESSOR) tablet 25 mg  25 mg Oral BID Lance Hutchison MD        niCARdipine 40 mg/200 mL (0.2 mg/mL) infusion  0-15 mg/hr Intravenous Continuous Lance Hutchison MD 25 mL/hr at 01/18/23 1419 5 mg/hr at 01/18/23 1419     Current Outpatient Medications   Medication Sig Dispense Refill    aspirin 81 MG Chew Take 81 mg by mouth once daily.      atorvastatin (LIPITOR) 40 MG tablet Take 40 mg by mouth once daily.      b complex vitamins tablet Take 1 tablet by mouth once daily.      insulin detemir U-100 (LEVEMIR) 100 unit/mL injection Inject into the skin every evening.      irbesartan (AVAPRO) 300 MG tablet Take 300 mg by mouth every evening.      metFORMIN (GLUCOPHAGE) 500 MG tablet Take 500 mg by mouth 2 (two) times daily with meals.      metoprolol tartrate (LOPRESSOR) 25 MG tablet Take 25 mg by mouth 2 (two) times daily.      naproxen (NAPROSYN) 500 MG tablet Take 1 tablet (500 mg total) by mouth 2 (two) times daily with meals. 20 tablet 0    pseudoephedrine-DM-guaiFENesin (POLY-VENT DM) 60- mg Tab Take 1 tablet by mouth every 6 (six) hours as needed. 20 tablet 0    timoloL 0.25 % ophthalmic solution 1-2 drops 2 (two) times daily.             Past History     Past Medical History:   Past Medical History:   Diagnosis Date    Diabetes mellitus type I     Hypertension     Stroke         Past Surgical History:   History reviewed. No pertinent surgical history.     Family History:   History reviewed. No pertinent family history.     Social History:   Social History     Tobacco Use    Smoking status: Never    Smokeless tobacco: Never   Substance Use Topics    Alcohol use: Not Currently     Drug use: Never        Allergies:   Review of patient's allergies indicates:  No Known Allergies       Review of Systems   Review of Systems   See HPI for pertinent positives and negatives       Physical Exam     Vitals:    01/18/23 1457 01/18/23 1510 01/18/23 1527 01/18/23 1532   BP: 132/66      Pulse:    69   Resp:   15    Temp:  (!) 95.4 °F (35.2 °C)     SpO2:    97%   Weight:       Height:          Physical Exam  Vitals and nursing note reviewed.   Constitutional:       General: He is not in acute distress.     Appearance: Normal appearance. He is well-developed. He is not ill-appearing.      Comments: Well-groomed elderly male, lying in bed, appears awake with psychomotor retardation and lethargy   HENT:      Head: Normocephalic and atraumatic.   Eyes:      Extraocular Movements: Extraocular movements intact.      Conjunctiva/sclera: Conjunctivae normal.   Cardiovascular:      Rate and Rhythm: Regular rhythm. Bradycardia present.   Pulmonary:      Effort: Pulmonary effort is normal. No respiratory distress.   Musculoskeletal:         General: No deformity or signs of injury. Normal range of motion.      Cervical back: Normal range of motion. No rigidity.   Skin:     General: Skin is dry.      Coloration: Skin is not pale.      Findings: No rash.      Comments: Cool skin   Neurological:      General: No focal deficit present.      Mental Status: He is lethargic.      GCS: GCS eye subscore is 4. GCS verbal subscore is 5. GCS motor subscore is 6.      Cranial Nerves: Facial asymmetry (?left droop) present. No cranial nerve deficit.      Motor: Motor function is intact. No weakness.            Diagnostic Study Results      Labs -   Recent Results (from the past 12 hour(s))   CBC W/ AUTO DIFFERENTIAL    Collection Time: 01/18/23  2:04 PM   Result Value Ref Range    WBC 7.01 3.90 - 12.70 K/uL    RBC 3.74 (L) 4.60 - 6.20 M/uL    Hemoglobin 11.4 (L) 14.0 - 18.0 g/dL    Hematocrit 34.9 (L) 40.0 - 54.0 %    MCV 93 82 -  98 fL    MCH 30.5 27.0 - 31.0 pg    MCHC 32.7 32.0 - 36.0 g/dL    RDW 14.7 (H) 11.5 - 14.5 %    Platelets 123 (L) 150 - 450 K/uL    MPV 11.6 9.2 - 12.9 fL    Immature Granulocytes 0.3 0.0 - 0.5 %    Gran # (ANC) 5.3 1.8 - 7.7 K/uL    Immature Grans (Abs) 0.02 0.00 - 0.04 K/uL    Lymph # 1.1 1.0 - 4.8 K/uL    Mono # 0.5 0.3 - 1.0 K/uL    Eos # 0.1 0.0 - 0.5 K/uL    Baso # 0.01 0.00 - 0.20 K/uL    nRBC 0 0 /100 WBC    Gran % 75.4 (H) 38.0 - 73.0 %    Lymph % 16.0 (L) 18.0 - 48.0 %    Mono % 7.1 4.0 - 15.0 %    Eosinophil % 1.1 0.0 - 8.0 %    Basophil % 0.1 0.0 - 1.9 %    Differential Method Automated    Comprehensive metabolic panel    Collection Time: 01/18/23  2:04 PM   Result Value Ref Range    Sodium 140 136 - 145 mmol/L    Potassium 4.0 3.5 - 5.1 mmol/L    Chloride 107 95 - 110 mmol/L    CO2 29 23 - 29 mmol/L    Glucose 197 (H) 70 - 110 mg/dL    BUN 19 8 - 23 mg/dL    Creatinine 1.6 (H) 0.5 - 1.4 mg/dL    Calcium 7.9 (L) 8.7 - 10.5 mg/dL    Total Protein 7.5 6.0 - 8.4 g/dL    Albumin 3.3 (L) 3.5 - 5.2 g/dL    Total Bilirubin 0.7 0.1 - 1.0 mg/dL    Alkaline Phosphatase 61 55 - 135 U/L    AST 32 10 - 40 U/L    ALT 30 10 - 44 U/L    Anion Gap 4 (L) 8 - 16 mmol/L    eGFR 44.4 (A) >60 mL/min/1.73 m^2   Protime-INR    Collection Time: 01/18/23  2:04 PM   Result Value Ref Range    Prothrombin Time 12.9 (H) 9.0 - 12.5 sec    INR 1.2 0.8 - 1.2   TSH    Collection Time: 01/18/23  2:04 PM   Result Value Ref Range    TSH 3.390 0.400 - 4.000 uIU/mL   LDL - Lipid Panel    Collection Time: 01/18/23  2:04 PM   Result Value Ref Range    Cholesterol 117 (L) 120 - 199 mg/dL    Triglycerides 49 30 - 150 mg/dL    HDL 40 40 - 75 mg/dL    LDL Cholesterol 67.2 63.0 - 159.0 mg/dL    HDL/Cholesterol Ratio 34.2 20.0 - 50.0 %    Total Cholesterol/HDL Ratio 2.9 2.0 - 5.0    Non-HDL Cholesterol 77 mg/dL   Ethanol    Collection Time: 01/18/23  2:04 PM   Result Value Ref Range    Alcohol, Serum <3 <10 mg/dL   Type & Screen    Collection Time:  01/18/23  2:19 PM   Result Value Ref Range    Group & Rh A POS     Indirect Celeste NEG    Drug screen panel, emergency    Collection Time: 01/18/23  2:34 PM   Result Value Ref Range    Benzodiazepines Negative Negative    Methadone metabolites Negative Negative    Cocaine (Metab.) Negative Negative    Opiate Scrn, Ur Negative Negative    Barbiturate Screen, Ur Negative Negative    Amphetamine Screen, Ur Negative Negative    THC Negative Negative    Phencyclidine Negative Negative    Creatinine, Urine 60.9 23.0 - 375.0 mg/dL    Toxicology Information SEE COMMENT    POCT glucose    Collection Time: 01/18/23  2:44 PM   Result Value Ref Range    POCT Glucose 169 (H) 70 - 110 mg/dL   Urinalysis, Reflex to Urine Culture Urine, Clean Catch    Collection Time: 01/18/23  3:29 PM    Specimen: Urine   Result Value Ref Range    Specimen UA Urine, Unspecified     Color, UA Yellow Yellow, Straw, Jacque    Appearance, UA Clear Clear    pH, UA 7.0 5.0 - 8.0    Specific Gravity, UA 1.015 1.005 - 1.030    Protein, UA 1+ (A) Negative    Glucose, UA 2+ (A) Negative    Ketones, UA Negative Negative    Bilirubin (UA) Negative Negative    Occult Blood UA 1+ (A) Negative    Nitrite, UA Negative Negative    Urobilinogen, UA Negative <2.0 EU/dL    Leukocytes, UA Negative Negative   Urinalysis Microscopic    Collection Time: 01/18/23  3:29 PM   Result Value Ref Range    RBC, UA 5 (H) 0 - 4 /hpf    WBC, UA 9 (H) 0 - 5 /hpf    Bacteria Many (A) None-Occ /hpf    Squam Epithel, UA 1 /hpf    Hyaline Casts, UA 0.8 (A) 0-1/lpf /lpf    Microscopic Comment SEE COMMENT    POCT COVID-19 Rapid Screening    Collection Time: 01/18/23  3:49 PM   Result Value Ref Range    POC Rapid COVID Negative Negative     Acceptable Yes         Radiologic Studies -    CTA Head   Final Result      Patency of the major intracranial arteries without evidence of aneurysm or considerable stenosis.         Electronically signed by: Ayo Gibbs MD    Date:    01/18/2023   Time:    15:47      CTA Neck   Final Result      Patency of the major arteries within the neck without evidence of aneurysm, dissection or significant stenosis.         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         Electronically signed by: Ayo Gibbs MD   Date:    01/18/2023   Time:    14:49           Medications given in the ED-   Medications   niCARdipine 40 mg/200 mL (0.2 mg/mL) infusion (5 mg/hr Intravenous New Bag 1/18/23 1419)   losartan tablet 100 mg (has no administration in time range)   metoprolol tartrate (LOPRESSOR) tablet 25 mg (has no administration in time range)   insulin aspart U-100 pen 0-5 Units (has no administration in time range)   glucose chewable tablet 16 g (has no administration in time range)   glucose chewable tablet 24 g (has no administration in time range)   dextrose 50% injection 12.5 g (has no administration in time range)   dextrose 50% injection 25 g (has no administration in time range)   glucagon (human recombinant) injection 1 mg (has no administration in time range)   cefTRIAXone (ROCEPHIN) 1 g in dextrose 5 % in water (D5W) 5 % 50 mL IVPB (MB+) (has no administration in time range)   iohexoL (OMNIPAQUE 350) injection 75 mL (75 mLs Intravenous Given 1/18/23 1447)   sodium chloride 0.9% bolus 1,000 mL 1,000 mL (1,000 mLs Intravenous New Bag 1/18/23 1549)   cefTRIAXone (ROCEPHIN) 1 g in dextrose 5 % in water (D5W) 5 % 50 mL IVPB (MB+) (0 g Intravenous Stopped 1/18/23 1627)           Medical Decision Making    I am the first provider for this patient.     I reviewed the vital signs, available nursing notes, past  medical history, past surgical history, family history and social history.     Vital Signs:  Reviewed the patient's vital signs.     Pulse Oximetry Analysis and Interpretation:    95% on Room Air, normal      EKG Interpretation: (Per my independent interpretation, pending formal read)   Interpreted by Lance Hutchison MD at 1400   Sinus bradycardia rate of 42 with first-degree AV block lateral T-wave inversions,     CXR  Interpretation: (Per my independent interpretation, pending formal read)   CXR read by Dr. Lance Hutchison at 1411    Cardiomegaly, sternotomy wires,  hazy interstitial pattern     External Test Results (Pertinent to encounter):    Records Reviewed: Nursing Notes, Current Prescription Medications, and Old Medical Records    History Obtained By: EMS    Provider Notes: Ramirez Cagle is a 77 y.o. male with altered mental status - per EMS patient last known well about 3-1/2 hours prior to arrival    Co-morbidities Considered:  Diabetes, hypertension    Differential Diagnosis:  CVA, electrolyte abnormality, intoxication, endocrine, HTN emergency      ED Course:    2:03 PM  Stroke alert called c/f ?brainstem infarct/ICH + cushings triad, VAN neg. Pt to CT.    2:06 PM  Family confirms patient recently started on Eliquis 2 weeks ago.  Cardizem drip ordered for hypertension    2:29 PM  Spoke with patient's children.  Corroborate EMS report that patient went outside to have his coffee and then came back inside moaning and in recliner.  Wife took off his outdoor code and clothes and put cool towels on him.  Patient went outside around 10 30 and came back inside altered around 12 30 or 1:00 p.m.  They report patient has had prior strokes and has some balance issues and short-term memory loss.  They state his presentation today is very unusual for him.  He otherwise is fairly independent and dresses and cleans himself.  Family organizes and gives medication to him.  No concern for accidental  overdose    2:34 PM  D/W Tele neuro.  Acute findings on CT.  Recommend CTA head and neck for posterior infarct before aggressive blood pressure control    4:01 PM  Pertinent labs noted below.  Grossly unremarkable.  CTA head and neck negative for acute findings.  Urinalysis consistent with UTI.  Will start on Rocephin.  Blood pressure improved with Cardene drip.    CONSULT NOTE:    5:09 PM      Dr. Hutchison discussed care with?Dr Gallego, Hospitalist   It was a standard discussion,?including history of patients chief complaint, available diagnostic results, and treatment course.?Discussed case. Agrees with admission    CTA head and neck negative for acute findings.  UA consistent with urinary tract infection.  Started on Rocephin for complicated UTI  Cardene drip was able to be discontinued.  Doubt hypertensive encephalopathy.  Altered mental status likelyrelated to UTI.    Plan on admitting to floor for altered mental status and complicated UTI.           ED Course as of 01/18/23 1710 Wed Jan 18, 2023   1445 Hemoglobin(!): 11.4 [MO]   1445 WBC: 7.01 [MO]   1445 Creatinine(!): 1.6 [MO]   1445 Glucose(!): 197 [MO]   1445 TSH: 3.390 [MO]   1445 Alcohol, Serum: <3 [MO]      ED Course User Index  [MO] Lance Hutchison MD       Problems Addressed:  Encephalopathy, UTI    Procedures:   Procedures       Diagnosis and Disposition     Critical Care:    5:10 PM     I have spent 46 minutes of critical care time involved in lab review, consultations with specialist, family decision-making, and documentation.  During this entire length of time I was immediately available to the patient.     Critical Care:  The reason for providing this level of medical care for this critically ill patient was due a critical illness that impaired one or more vital organ systems such that there was a high probability of imminent or life threatening deterioration in the patients condition. This care involved high complexity decision making to  assess, manipulate, and support vital system functions, to treat this degreee vital organ system failure and to prevent further life threatening deterioration of the patients condition.               CLINICAL IMPRESSION:         1. Metabolic encephalopathy    2. Urinary tract infection without hematuria, site unspecified    3. Primary hypertension              PLAN:   1. Admit to Hospital  2.      Medication List        ASK your doctor about these medications      aspirin 81 MG Chew     atorvastatin 40 MG tablet  Commonly known as: LIPITOR     b complex vitamins tablet     insulin detemir U-100 100 unit/mL injection  Commonly known as: Levemir     irbesartan 300 MG tablet  Commonly known as: AVAPRO     metFORMIN 500 MG tablet  Commonly known as: GLUCOPHAGE     metoprolol tartrate 25 MG tablet  Commonly known as: LOPRESSOR     naproxen 500 MG tablet  Commonly known as: NAPROSYN  Take 1 tablet (500 mg total) by mouth 2 (two) times daily with meals.     POLY-VENT DM 60- mg Tab  Generic drug: pseudoephedrine-DM-guaiFENesin  Take 1 tablet by mouth every 6 (six) hours as needed.     timoloL 0.25 % ophthalmic solution             3. No follow-up provider specified.     _______________________________     Please note that this dictation was completed with Audingo, the computer voice recognition software.  Quite often unanticipated grammatical, syntax, homophones, and other interpretive errors are inadvertently transcribed by the computer software.  Please disregard these errors.  Please excuse any errors that have escaped final proofreading.             Lance Hutchison MD  01/18/23 0704

## 2023-01-19 PROBLEM — N30.00 ACUTE CYSTITIS WITHOUT HEMATURIA: Status: ACTIVE | Noted: 2023-01-19

## 2023-01-19 PROBLEM — Z79.4 TYPE 2 DIABETES MELLITUS WITHOUT COMPLICATION, WITH LONG-TERM CURRENT USE OF INSULIN: Status: ACTIVE | Noted: 2023-01-19

## 2023-01-19 PROBLEM — E11.9 TYPE 2 DIABETES MELLITUS WITHOUT COMPLICATION, WITH LONG-TERM CURRENT USE OF INSULIN: Status: ACTIVE | Noted: 2023-01-19

## 2023-01-19 PROBLEM — I25.10 CORONARY ARTERY DISEASE INVOLVING NATIVE CORONARY ARTERY OF NATIVE HEART WITHOUT ANGINA PECTORIS: Status: ACTIVE | Noted: 2023-01-19

## 2023-01-19 PROBLEM — I10 MALIGNANT HYPERTENSION: Status: ACTIVE | Noted: 2023-01-19

## 2023-01-19 LAB
ANION GAP SERPL CALC-SCNC: 5 MMOL/L (ref 8–16)
BASOPHILS # BLD AUTO: 0.02 K/UL (ref 0–0.2)
BASOPHILS NFR BLD: 0.3 % (ref 0–1.9)
BUN SERPL-MCNC: 13 MG/DL (ref 8–23)
CALCIUM SERPL-MCNC: 8.3 MG/DL (ref 8.7–10.5)
CHLORIDE SERPL-SCNC: 110 MMOL/L (ref 95–110)
CO2 SERPL-SCNC: 27 MMOL/L (ref 23–29)
CREAT SERPL-MCNC: 1.3 MG/DL (ref 0.5–1.4)
DIFFERENTIAL METHOD: ABNORMAL
EOSINOPHIL # BLD AUTO: 0.1 K/UL (ref 0–0.5)
EOSINOPHIL NFR BLD: 1.1 % (ref 0–8)
ERYTHROCYTE [DISTWIDTH] IN BLOOD BY AUTOMATED COUNT: 14.7 % (ref 11.5–14.5)
EST. GFR  (NO RACE VARIABLE): 56.9 ML/MIN/1.73 M^2
GLUCOSE SERPL-MCNC: 133 MG/DL (ref 70–110)
HCT VFR BLD AUTO: 35.8 % (ref 40–54)
HGB BLD-MCNC: 11.9 G/DL (ref 14–18)
IMM GRANULOCYTES # BLD AUTO: 0.01 K/UL (ref 0–0.04)
IMM GRANULOCYTES NFR BLD AUTO: 0.2 % (ref 0–0.5)
LYMPHOCYTES # BLD AUTO: 1.1 K/UL (ref 1–4.8)
LYMPHOCYTES NFR BLD: 17.5 % (ref 18–48)
MCH RBC QN AUTO: 30.7 PG (ref 27–31)
MCHC RBC AUTO-ENTMCNC: 33.2 G/DL (ref 32–36)
MCV RBC AUTO: 93 FL (ref 82–98)
MONOCYTES # BLD AUTO: 0.7 K/UL (ref 0.3–1)
MONOCYTES NFR BLD: 11.5 % (ref 4–15)
NEUTROPHILS # BLD AUTO: 4.4 K/UL (ref 1.8–7.7)
NEUTROPHILS NFR BLD: 69.4 % (ref 38–73)
NRBC BLD-RTO: 0 /100 WBC
PLATELET # BLD AUTO: 124 K/UL (ref 150–450)
PMV BLD AUTO: 11.4 FL (ref 9.2–12.9)
POCT GLUCOSE: 182 MG/DL (ref 70–110)
POTASSIUM SERPL-SCNC: 3.4 MMOL/L (ref 3.5–5.1)
RBC # BLD AUTO: 3.87 M/UL (ref 4.6–6.2)
SODIUM SERPL-SCNC: 142 MMOL/L (ref 136–145)
TROPONIN I SERPL DL<=0.01 NG/ML-MCNC: 40.4 PG/ML (ref 0–60)
WBC # BLD AUTO: 6.33 K/UL (ref 3.9–12.7)

## 2023-01-19 PROCEDURE — 84484 ASSAY OF TROPONIN QUANT: CPT | Performed by: INTERNAL MEDICINE

## 2023-01-19 PROCEDURE — 96366 THER/PROPH/DIAG IV INF ADDON: CPT

## 2023-01-19 PROCEDURE — 99900031 HC PATIENT EDUCATION (STAT)

## 2023-01-19 PROCEDURE — 36415 COLL VENOUS BLD VENIPUNCTURE: CPT | Performed by: INTERNAL MEDICINE

## 2023-01-19 PROCEDURE — 96372 THER/PROPH/DIAG INJ SC/IM: CPT | Performed by: EMERGENCY MEDICINE

## 2023-01-19 PROCEDURE — 94761 N-INVAS EAR/PLS OXIMETRY MLT: CPT

## 2023-01-19 PROCEDURE — 25000003 PHARM REV CODE 250: Performed by: EMERGENCY MEDICINE

## 2023-01-19 PROCEDURE — G0378 HOSPITAL OBSERVATION PER HR: HCPCS

## 2023-01-19 PROCEDURE — 63600175 PHARM REV CODE 636 W HCPCS: Performed by: INTERNAL MEDICINE

## 2023-01-19 PROCEDURE — 85025 COMPLETE CBC W/AUTO DIFF WBC: CPT | Performed by: EMERGENCY MEDICINE

## 2023-01-19 PROCEDURE — 80048 BASIC METABOLIC PNL TOTAL CA: CPT | Performed by: EMERGENCY MEDICINE

## 2023-01-19 PROCEDURE — 36415 COLL VENOUS BLD VENIPUNCTURE: CPT | Performed by: EMERGENCY MEDICINE

## 2023-01-19 PROCEDURE — 96361 HYDRATE IV INFUSION ADD-ON: CPT

## 2023-01-19 PROCEDURE — 99900035 HC TECH TIME PER 15 MIN (STAT)

## 2023-01-19 PROCEDURE — 63600175 PHARM REV CODE 636 W HCPCS: Performed by: EMERGENCY MEDICINE

## 2023-01-19 PROCEDURE — 25000003 PHARM REV CODE 250: Performed by: INTERNAL MEDICINE

## 2023-01-19 RX ORDER — AMLODIPINE BESYLATE 5 MG/1
5 TABLET ORAL DAILY
Status: DISCONTINUED | OUTPATIENT
Start: 2023-01-19 | End: 2023-01-20

## 2023-01-19 RX ORDER — TIMOLOL MALEATE 5 MG/ML
1 SOLUTION/ DROPS OPHTHALMIC 2 TIMES DAILY
Status: DISCONTINUED | OUTPATIENT
Start: 2023-01-19 | End: 2023-01-21 | Stop reason: HOSPADM

## 2023-01-19 RX ORDER — SODIUM CHLORIDE 9 MG/ML
INJECTION, SOLUTION INTRAVENOUS CONTINUOUS
Status: DISCONTINUED | OUTPATIENT
Start: 2023-01-19 | End: 2023-01-21 | Stop reason: HOSPADM

## 2023-01-19 RX ORDER — LORAZEPAM 1 MG/1
1 TABLET ORAL NIGHTLY PRN
Status: DISCONTINUED | OUTPATIENT
Start: 2023-01-19 | End: 2023-01-21 | Stop reason: HOSPADM

## 2023-01-19 RX ORDER — NAPROXEN SODIUM 220 MG/1
81 TABLET, FILM COATED ORAL DAILY
Status: DISCONTINUED | OUTPATIENT
Start: 2023-01-19 | End: 2023-01-21 | Stop reason: HOSPADM

## 2023-01-19 RX ORDER — ATORVASTATIN CALCIUM 40 MG/1
40 TABLET, FILM COATED ORAL DAILY
Status: DISCONTINUED | OUTPATIENT
Start: 2023-01-19 | End: 2023-01-21 | Stop reason: HOSPADM

## 2023-01-19 RX ADMIN — METOPROLOL TARTRATE 25 MG: 25 TABLET, FILM COATED ORAL at 08:01

## 2023-01-19 RX ADMIN — CEFTRIAXONE SODIUM 1 G: 1 INJECTION, POWDER, FOR SOLUTION INTRAMUSCULAR; INTRAVENOUS at 08:01

## 2023-01-19 RX ADMIN — AMLODIPINE BESYLATE 5 MG: 5 TABLET ORAL at 08:01

## 2023-01-19 RX ADMIN — LORAZEPAM 1 MG: 1 TABLET ORAL at 08:01

## 2023-01-19 RX ADMIN — INSULIN ASPART 1 UNITS: 100 INJECTION, SOLUTION INTRAVENOUS; SUBCUTANEOUS at 08:01

## 2023-01-19 RX ADMIN — ATORVASTATIN CALCIUM 40 MG: 40 TABLET, FILM COATED ORAL at 08:01

## 2023-01-19 RX ADMIN — METOPROLOL TARTRATE 25 MG: 25 TABLET, FILM COATED ORAL at 05:01

## 2023-01-19 RX ADMIN — LOSARTAN POTASSIUM 100 MG: 50 TABLET, FILM COATED ORAL at 08:01

## 2023-01-19 RX ADMIN — SODIUM CHLORIDE: 9 INJECTION, SOLUTION INTRAVENOUS at 08:01

## 2023-01-19 RX ADMIN — TIMOLOL MALEATE 1 DROP: 5 SOLUTION/ DROPS OPHTHALMIC at 10:01

## 2023-01-19 RX ADMIN — ASPIRIN 81 MG CHEWABLE TABLET 81 MG: 81 TABLET CHEWABLE at 08:01

## 2023-01-19 RX ADMIN — SODIUM CHLORIDE: 9 INJECTION, SOLUTION INTRAVENOUS at 11:01

## 2023-01-19 RX ADMIN — TIMOLOL MALEATE 1 DROP: 5 SOLUTION/ DROPS OPHTHALMIC at 08:01

## 2023-01-19 NOTE — PLAN OF CARE
[place] [facility]  Initial Discharge Assessment       Primary Care Provider: Kalen Shaffer MD    Admission Diagnosis: Metabolic encephalopathy [G93.41]  Primary hypertension [I10]  Urinary tract infection without hematuria, site unspecified [N39.0]    Admission Date: 1/18/2023  Expected Discharge Date:     Discharge Barriers Identified: None    Payor: HU[employer] MANAGED MEDICARE / Plan: HU[employer] MEDICARE PPO / Product Type: Medicare Advantage /     Extended Emergency Contact Information  Primary Emergency Contact: Bonifacio An  Mobile Phone: [phone]  Relation: Daughter  Secondary Emergency Contact: Gee Cagle  Mobile Phone: [phone]  Relation: Son    Discharge Plan A: Home, Home with family  Discharge Plan B: Home with family, Home Health    No Pharmacies Listed    Initial Assessment (most recent)       Adult Discharge Assessment - 01/19/23 1116          Discharge Assessment    Assessment Type Discharge Planning Assessment     Confirmed/corrected address, phone number and insurance Yes     Confirmed Demographics Correct on Facesheet     Source of Information patient;family     Reason For Admission Malignant hypertension     People in Home child(omar), adult;grandchild(omar)     Do you expect to return to your current living situation? Yes     Do you have help at home or someone to help you manage your care at home? Yes     Who are your caregiver(s) and their phone number(s)? --   The patient has support from his family.    Prior to hospitilization cognitive status: --     Current cognitive status: Alert/Oriented     Do you have any problems with: Errands/Grocery;Needs other help     Specify other help The patient has family who assist him with his care     Home Accessibility stairs within home     Number of Stairs, Within Home, Primary five     Stair Railings, Within Home, Primary railing on right side (ascending)     Home Layout Able to live on 1st floor     Equipment Currently Used at Home glucometer      Readmission within 30 days? No     Patient currently being followed by outpatient case management? No     Do you currently have service(s) that help you manage your care at home? No     Do you take prescription medications? Yes     Do you have prescription coverage? Yes     Coverage [employer] MANAGED MEDICARE - [employer] MEDICARE PPO     Do you have any problems affording any of your prescribed medications? No     Is the patient taking medications as prescribed? yes     Who is going to help you get home at discharge? family     How do you get to doctors appointments? family or friend will provide     Are you on dialysis? No     Do you take coumadin? No     Discharge Plan A Home;Home with family     Discharge Plan B Home with family;Home Health     DME Needed Upon Discharge  other (see comments)   TBD    Discharge Plan discussed with: Patient;Adult children     Discharge Barriers Identified None                 Initial discharge assessment is completed. Spoke to the patient, his son, and daughter. The patient lives in a mobile home with his family. His ex-wife also lives next door to him, and she assist him with his care as well. Prior to being admitted to the hospital, the patient did not utilize any DME to walk with. He was also able to complete his ADLs without any assistance. His family assist him with his needs/errands. Contact information was left in the patient's room. SW/CM will continue to monitor.

## 2023-01-19 NOTE — ASSESSMENT & PLAN NOTE
Patient's FSGs are controlled on current medication regimen.  Last A1c reviewed-   Lab Results   Component Value Date    HGBA1C 6.7 (H) 01/18/2023     Most recent fingerstick glucose reviewed-   Recent Labs   Lab 01/18/23  1444 01/18/23  1718 01/18/23  2126   POCTGLUCOSE 169* 195* 152*     Current correctional scale  High  Maintain anti-hyperglycemic dose as follows-   Antihyperglycemics (From admission, onward)    Start     Stop Route Frequency Ordered    01/18/23 1807  insulin aspart U-100 pen 0-5 Units         -- SubQ Before meals & nightly PRN 01/18/23 1707        Hold Oral hypoglycemics while patient is in the hospital.

## 2023-01-19 NOTE — SUBJECTIVE & OBJECTIVE
Past Medical History:   Diagnosis Date    Diabetes mellitus type I     Hypertension     Stroke        History reviewed. No pertinent surgical history.    Review of patient's allergies indicates:  No Known Allergies    No current facility-administered medications on file prior to encounter.     Current Outpatient Medications on File Prior to Encounter   Medication Sig    aspirin 81 MG Chew Take 81 mg by mouth once daily.    atorvastatin (LIPITOR) 40 MG tablet Take 40 mg by mouth once daily.    b complex vitamins tablet Take 1 tablet by mouth once daily.    insulin detemir U-100 (LEVEMIR) 100 unit/mL injection Inject into the skin every evening.    irbesartan (AVAPRO) 300 MG tablet Take 300 mg by mouth every evening.    metFORMIN (GLUCOPHAGE) 500 MG tablet Take 500 mg by mouth 2 (two) times daily with meals.    metoprolol tartrate (LOPRESSOR) 25 MG tablet Take 25 mg by mouth 2 (two) times daily.    naproxen (NAPROSYN) 500 MG tablet Take 1 tablet (500 mg total) by mouth 2 (two) times daily with meals.    pseudoephedrine-DM-guaiFENesin (POLY-VENT DM) 60- mg Tab Take 1 tablet by mouth every 6 (six) hours as needed.    timoloL 0.25 % ophthalmic solution 1-2 drops 2 (two) times daily.     Family History    None       Tobacco Use    Smoking status: Never    Smokeless tobacco: Never   Substance and Sexual Activity    Alcohol use: Not Currently    Drug use: Never    Sexual activity: Not Currently     Review of Systems   Constitutional:  Positive for activity change, appetite change, diaphoresis and fatigue. Negative for chills and fever.   HENT:  Negative for ear pain, mouth sores, nosebleeds and sore throat.    Eyes:  Negative for visual disturbance.   Respiratory:  Negative for cough, shortness of breath and wheezing.    Cardiovascular:  Negative for chest pain, palpitations and leg swelling.   Gastrointestinal:  Negative for abdominal distention, abdominal pain, blood in stool, constipation, diarrhea, nausea and  vomiting.   Endocrine: Negative for polyphagia.   Genitourinary:  Negative for difficulty urinating, dysuria, flank pain and frequency.   Musculoskeletal:  Negative for arthralgias, back pain and myalgias.   Skin:  Negative for rash.   Neurological:  Negative for dizziness, tremors, seizures, syncope, facial asymmetry, speech difficulty and headaches.   Hematological:  Negative for adenopathy.   Psychiatric/Behavioral:  Positive for confusion. Negative for agitation and hallucinations. The patient is not nervous/anxious.    Objective:     Vital Signs (Most Recent):  Temp: 98.6 °F (37 °C) (01/19/23 0444)  Pulse: 65 (01/19/23 0701)  Resp: 18 (01/19/23 0444)  BP: (!) 174/88 (01/19/23 0619)  SpO2: 97 % (01/19/23 0444)   Vital Signs (24h Range):  Temp:  [95.4 °F (35.2 °C)-98.6 °F (37 °C)] 98.6 °F (37 °C)  Pulse:  [44-90] 65  Resp:  [15-21] 18  SpO2:  [91 %-97 %] 97 %  BP: (132-230)/() 174/88     Weight: 90.3 kg (199 lb)  Body mass index is 28.55 kg/m².    Physical Exam  Constitutional:       General: He is awake. He is not in acute distress.     Appearance: Normal appearance. He is not ill-appearing, toxic-appearing or diaphoretic.   HENT:      Head: Normocephalic and atraumatic.      Nose: Nose normal. No congestion or rhinorrhea.      Mouth/Throat:      Mouth: Mucous membranes are moist.      Pharynx: Oropharynx is clear. No oropharyngeal exudate or posterior oropharyngeal erythema.   Eyes:      General: No scleral icterus.        Right eye: No discharge.         Left eye: No discharge.      Extraocular Movements: Extraocular movements intact.      Pupils: Pupils are equal, round, and reactive to light.   Neck:      Thyroid: No thyroid mass or thyromegaly.      Vascular: No carotid bruit.      Meningeal: Brudzinski's sign and Kernig's sign absent.   Cardiovascular:      Rate and Rhythm: Normal rate and regular rhythm.      Chest Wall: PMI is not displaced. No thrill.      Pulses: Normal pulses.      Heart sounds:  Normal heart sounds. No murmur heard.    No friction rub. No gallop.   Pulmonary:      Effort: Pulmonary effort is normal. No tachypnea, accessory muscle usage, prolonged expiration or respiratory distress.      Breath sounds: Normal breath sounds. No stridor or decreased air movement. No wheezing, rhonchi or rales.   Chest:      Chest wall: No tenderness.   Abdominal:      General: Bowel sounds are normal. There is no distension.      Palpations: Abdomen is soft. There is no hepatomegaly, splenomegaly or mass.      Tenderness: There is no abdominal tenderness. There is no right CVA tenderness, left CVA tenderness, guarding or rebound.      Hernia: No hernia is present.   Musculoskeletal:         General: No swelling, tenderness, deformity or signs of injury.      Cervical back: Neck supple. No rigidity. No muscular tenderness.      Right lower leg: No edema.      Left lower leg: No edema.   Lymphadenopathy:      Cervical: No cervical adenopathy.   Skin:     General: Skin is warm.      Capillary Refill: Capillary refill takes less than 2 seconds.      Coloration: Skin is not cyanotic, jaundiced or pale.      Findings: No bruising, erythema, lesion, petechiae or rash.   Neurological:      General: No focal deficit present.      Mental Status: He is alert and oriented to person, place, and time. Mental status is at baseline.      Cranial Nerves: No cranial nerve deficit, dysarthria or facial asymmetry.      Motor: No weakness or tremor.   Psychiatric:         Mood and Affect: Mood normal. Mood is not anxious or depressed. Affect is not labile or flat.         Speech: Speech is not rapid and pressured or slurred.         Behavior: Behavior normal. Behavior is not agitated, aggressive or combative.         Thought Content: Thought content normal. Thought content is not paranoid or delusional.         Cognition and Memory: Cognition is not impaired. Memory is not impaired.         Judgment: Judgment normal. Judgment is  not impulsive or inappropriate.       CRANIAL NERVES     CN III, IV, VI   Pupils are equal, round, and reactive to light.     Significant Labs: CBC:   Recent Labs   Lab 01/18/23  1404 01/19/23  0555   WBC 7.01 6.33   HGB 11.4* 11.9*   HCT 34.9* 35.8*   * 124*     CMP:   Recent Labs   Lab 01/18/23  1404 01/19/23  0555    142   K 4.0 3.4*    110   CO2 29 27   * 133*   BUN 19 13   CREATININE 1.6* 1.3   CALCIUM 7.9* 8.3*   PROT 7.5  --    ALBUMIN 3.3*  --    BILITOT 0.7  --    ALKPHOS 61  --    AST 32  --    ALT 30  --    ANIONGAP 4* 5*     TSH:   Recent Labs   Lab 01/18/23  1404   TSH 3.390     Urine Studies:   Recent Labs   Lab 01/18/23  1529   COLORU Yellow   APPEARANCEUA Clear   PHUR 7.0   SPECGRAV 1.015   PROTEINUA 1+*   GLUCUA 2+*   KETONESU Negative   BILIRUBINUA Negative   OCCULTUA 1+*   NITRITE Negative   UROBILINOGEN Negative   LEUKOCYTESUR Negative   RBCUA 5*   WBCUA 9*   BACTERIA Many*   SQUAMEPITHEL 1   HYALINECASTS 0.8*       Significant Imaging: I have reviewed all pertinent imaging results/findings within the past 24 hours.

## 2023-01-19 NOTE — PLAN OF CARE
01/19/23 1031   MAHAN Message   Medicare Outpatient and Observation Notification regarding financial responsibility Given to patient/caregiver;Explained to patient/caregiver;Signed/date by patient/caregiver   Date MAHAN was signed 01/19/23   Time MAHAN was signed [phone]

## 2023-01-19 NOTE — HPI
ED HPI:  Ramirez Cagle is a 77 y.o. male with PMHX of hypertension, diabetes who presents to the emergency department C/O altered mental status.     Patient arrives by EMS after family called because patient was feeling weak.  They state that patient typically goes out in the morning and drinks coffee all day.  He was last known well around 10:30 a.m..  Patient came in around 1:00 p.m. and told family members he was feeling weak.  He reportedly was wearing heavy clothes and felt hot and overheated.  EMS was called and patient arrives here at 1:50 p.m..     In the ED patient is awake but with diminished mentation.  Follows commands.  Does not have focal neurological deficit       Field cbg normal.    IM HPI:  Patient states he has been feeling well in his normal state of health until yesterday he began having lightheadedness felt hot and became confused.  The patient was brought into the emergency department and after a stroke workup was found to have a urinary tract infection.  The patient was hypertensive started initially on IV medications for hypertensive urgency and after improvement he was moved to the floor.  The patient is receiving Rocephin patient states he is back to his baseline today but he has a full plate of food at his bedside and states that he is not hungry.  Patient states he did not sleep well last night is requesting something for sleep aid and anxiety.  Patient does have history of coronary artery disease chart has been reviewed and updated.  Patient is a poor historian, I do not see a troponin in the chart, no reports of CP.  Recent echocardiogram, ECG noted.

## 2023-01-20 PROCEDURE — 97161 PT EVAL LOW COMPLEX 20 MIN: CPT

## 2023-01-20 PROCEDURE — 97165 OT EVAL LOW COMPLEX 30 MIN: CPT

## 2023-01-20 PROCEDURE — 96366 THER/PROPH/DIAG IV INF ADDON: CPT

## 2023-01-20 PROCEDURE — 25000003 PHARM REV CODE 250: Performed by: INTERNAL MEDICINE

## 2023-01-20 PROCEDURE — 94761 N-INVAS EAR/PLS OXIMETRY MLT: CPT

## 2023-01-20 PROCEDURE — 99900031 HC PATIENT EDUCATION (STAT)

## 2023-01-20 PROCEDURE — 63600175 PHARM REV CODE 636 W HCPCS: Performed by: INTERNAL MEDICINE

## 2023-01-20 PROCEDURE — 96361 HYDRATE IV INFUSION ADD-ON: CPT

## 2023-01-20 PROCEDURE — 25000003 PHARM REV CODE 250: Performed by: EMERGENCY MEDICINE

## 2023-01-20 PROCEDURE — G0378 HOSPITAL OBSERVATION PER HR: HCPCS

## 2023-01-20 PROCEDURE — 99900035 HC TECH TIME PER 15 MIN (STAT)

## 2023-01-20 RX ORDER — AMLODIPINE BESYLATE 10 MG/1
10 TABLET ORAL DAILY
Status: DISCONTINUED | OUTPATIENT
Start: 2023-01-20 | End: 2023-01-21 | Stop reason: HOSPADM

## 2023-01-20 RX ADMIN — ATORVASTATIN CALCIUM 40 MG: 40 TABLET, FILM COATED ORAL at 08:01

## 2023-01-20 RX ADMIN — CEFTRIAXONE SODIUM 1 G: 1 INJECTION, POWDER, FOR SOLUTION INTRAMUSCULAR; INTRAVENOUS at 08:01

## 2023-01-20 RX ADMIN — CEFTRIAXONE SODIUM 1 G: 1 INJECTION, POWDER, FOR SOLUTION INTRAMUSCULAR; INTRAVENOUS at 09:01

## 2023-01-20 RX ADMIN — LOSARTAN POTASSIUM 100 MG: 50 TABLET, FILM COATED ORAL at 08:01

## 2023-01-20 RX ADMIN — ISOSORBIDE DINITRATE: 20 TABLET ORAL at 08:01

## 2023-01-20 RX ADMIN — METOPROLOL TARTRATE 25 MG: 25 TABLET, FILM COATED ORAL at 09:01

## 2023-01-20 RX ADMIN — METOPROLOL TARTRATE 25 MG: 25 TABLET, FILM COATED ORAL at 05:01

## 2023-01-20 RX ADMIN — ISOSORBIDE DINITRATE: 20 TABLET ORAL at 02:01

## 2023-01-20 RX ADMIN — AMLODIPINE BESYLATE 10 MG: 10 TABLET ORAL at 08:01

## 2023-01-20 RX ADMIN — ASPIRIN 81 MG CHEWABLE TABLET 81 MG: 81 TABLET CHEWABLE at 08:01

## 2023-01-20 RX ADMIN — TIMOLOL MALEATE 1 DROP: 5 SOLUTION/ DROPS OPHTHALMIC at 09:01

## 2023-01-20 RX ADMIN — ISOSORBIDE DINITRATE: 20 TABLET ORAL at 09:01

## 2023-01-20 NOTE — PT/OT/SLP EVAL
Occupational Therapy   Evaluation and Discharge Note    Name: Ramirez Cagle  MRN: [id number]  Admitting Diagnosis: Malignant hypertension  Recent Surgery: * No surgery found *      Recommendations:     Discharge Recommendations: home  Discharge Equipment Recommendations: none  Barriers to discharge:  None    Assessment:     Ramirez Cagle is a 77 y.o. male with a medical diagnosis of Malignant hypertension. At this time, patient is functioning at their prior level of function and does not require further acute OT services.     Plan:     During this hospitalization, patient does not require further acute OT services.  Please re-consult if situation changes.    Plan of Care Reviewed with: patient    Subjective     Chief Complaint: none  Patient/Family Comments/goals: Pt would like to return home as soon as possible.    Occupational Profile:  Living Environment: Pt lives with family in a mobile home with 5-6 steps and no handrails to enter / exit.  Previous level of function: Independent  Roles and Routines: ADL's and IADL's  Equipment Used at home: none  Assistance upon Discharge: Pt's family can assist as needed.     Pain/Comfort:  Pain Rating 1: 0/10  Pain Rating Post-Intervention 1: 0/10    Patients cultural, spiritual, Muslim conflicts given the current situation: no    Objective:     Communicated with: nurse prior to session.  Patient found supine with peripheral IV, telemetry upon OT entry to room.    General Precautions: Standard, fall  Orthopedic Precautions: N/A  Braces: N/A  Respiratory Status: Room air     Occupational Performance:    Bed Mobility:    Patient completed Rolling/Turning to Left with  independence  Patient completed Rolling/Turning to Right with independence  Patient completed Scooting/Bridging with independence  Patient completed Supine to Sit with independence  Patient completed Sit to Supine with independence    Functional Mobility/Transfers:  Patient completed Sit <> Stand Transfer  with independence  with  no assistive device   Patient completed Bed <> Chair Transfer using Step Transfer technique with independence with no assistive device  Patient completed Toilet Transfer Step Transfer technique with independence with  grab bars  Functional Mobility: Pt ambulated greater than 200' between surfaces independently without use of AD.     Activities of Daily Living:  Feeding:  independence    Grooming: independence    Bathing: independence    Upper Body Dressing: independence    Lower Body Dressing: independence    Toileting: independence      Cognitive/Visual Perceptual:  Cognitive/Psychosocial Skills:  -       Oriented to: Person, Place, Time, and Situation   -       Follows Commands/attention:Follows multistep  commands  -       Communication: clear/fluent  -       Memory: No Deficits noted  -       Safety awareness/insight to disability: intact   -       Mood/Affect/Coping skills/emotional control: Appropriate to situation    Physical Exam:  Postural examination/scapula alignment: -       Rounded shoulders  Skin integrity: Visible skin intact  Sensation: -       Intact  light/touch   and proprioception bilateral UE's  Dominant hand: -       Right  Upper Extremity Range of Motion:  -       Right Upper Extremity: WFL  -       Left Upper Extremity: WFL  Upper Extremity Strength: -       Right Upper Extremity: 4+ to 5/5  -       Left Upper Extremity: 4+ to 5/5  Fine Motor Coordination: -       Intact  Left hand, manipulation of objects and Right hand, manipulation of objects  Gross motor coordination: WFL    AMPAC 6 Click ADL:  AMPAC Total Score: 24    Treatment & Education:  Pt was provided education / instruction regarding role of OT and established OT POC.     Patient left HOB elevated with all lines intact, call button in reach, and nurse notified    GOALS:   Evaluation only.      History:     Past Medical History:   Diagnosis Date    Diabetes mellitus type I     Hypertension     Stroke         History reviewed. No pertinent surgical history.    Time Tracking:     OT Date of Treatment: 01/20/23  OT Start Time: 1325  OT Stop Time: 1350  OT Total Time (min): 25 min    Billable Minutes:Evaluation 25 1/20/2023

## 2023-01-20 NOTE — HOSPITAL COURSE
23 FM:  Patient ate 100% of his breakfast this morning and states he is ambulating with some assistance to the restroom.  He is having increasing urinary frequency and some discomfort.  Patient states he has a family  to attend tomorrow and would like to be discharged prior to this.  We are adding antihypertensives today and as long as his blood pressures remained stable we can discharge home early in the morning.  23 [place]: No acute events overnight. Afebrile. Per patient back to baseline function. BP normotensive after adjustments made yesterday.

## 2023-01-20 NOTE — PT/OT/SLP EVAL
"Physical Therapy Evaluation    Patient Name:  Ramirez Cagle   MRN:  [id number]    Recommendations:     Discharge Recommendations: home   Discharge Equipment Recommendations: none   Barriers to discharge: None    Assessment:     Ramirez Cagle is a 77 y.o. male admitted with a medical diagnosis of Malignant hypertension.  He presents with the following impairments/functional limitations:   eagerness to go home.  He reports that he is back to his baseline status both functionally and cognitively.  Patient completed supine <. Sit with sit <> stand independently without A.D., ambulated ~754 feet without A.D. with set up/modified independent, P.T. just following with the IV pole.  No LOB or SOB noted.  No P.T. intervention needed at this time. .    Rehab Prognosis: Good; patient would benefit from acute skilled PT services to address these deficits and reach maximum level of function.    Recent Surgery: * No surgery found *      Plan:     During this hospitalization, patient to be seen  (home with no P.T. follow up) to address the identified rehab impairments via   and progress toward the following goals:    Plan of Care Expires:   (no P.T. intervention needed at this time)    Subjective     Chief Complaint: "I am okay, I can go home now?"  Patient/Family Comments/goals: Go the  tomorrow.  Pain/Comfort:  Pain Rating 1: 0/10  Pain Rating Post-Intervention 1: 0/10    Patients cultural, spiritual, Restoration conflicts given the current situation: no    Living Environment:  Lives with daughter and granddaughter in a 1 story house with 4-5 steps to enter, no side rail   Prior to admission, patients level of function was independent with all ADL's and functional mobility .  Equipment used at home: glucometer.  DME owned (not currently used): none.  Upon discharge, patient will have assistance from daughter.    Objective:     Communicated with nurse and patient  prior to session.  Patient found supine with peripheral " IV, telemetry, pulse ox (continuous)  upon PT entry to room.    General Precautions: Standard, fall  Orthopedic Precautions:N/A   Braces: N/A  Respiratory Status: Room air    Exams:  Cognitive Exam:  Patient is oriented to Person, Place, Time, and Situation  Fine Motor Coordination:    -       Intact  Gross Motor Coordination:  WFL  Postural Exam:  Patient presented with the following abnormalities:    -       No postural abnormalities identified  Sensation:    -       Intact  Skin Integrity/Edema:      -       Skin integrity: Visible skin intact  RLE ROM: WFL  RLE Strength: WFL  LLE ROM: WFL  LLE Strength: WFL    Functional Mobility:  Bed Mobility:     Rolling Left:  independence  Rolling Right: independence  Scooting: independence  Bridging: independence  Supine to Sit: independence  Sit to Supine: independence  Transfers:     Sit to Stand:  independence with no AD  Gait: 754 feet with no A.D. set up assistance only while therapist was pushing the IV pole   Balance: independent with static sitting, independent with static standing without A.D.       AM-PAC 6 CLICK MOBILITY  Total Score:24       Treatment & Education:  P.T. initial evaluation, supine <> sit, sit <> stand without A.D., gait with RW and education on safety with mobility technique    Patient left supine with all lines intact, call button in reach, and nurse notified.    GOALS:   Multidisciplinary Problems       Physical Therapy Goals       Not on file                    History:     Past Medical History:   Diagnosis Date    Diabetes mellitus type I     Hypertension     Stroke        History reviewed. No pertinent surgical history.    Time Tracking:     PT Received On: 01/20/23  PT Start Time: 0820     PT Stop Time: 0841  PT Total Time (min): 21 min     Billable Minutes: Evaluation low complexity       01/20/2023

## 2023-01-20 NOTE — PROGRESS NOTES
[facility] Medicine  Progress Note    Patient Name: Ramirez Cagle  MRN: [id number]  Patient Class: OP- Observation   Admission Date: 1/18/2023  Length of Stay: 0 days  Attending Physician: Juan C Gallego III, MD  Primary Care Provider: Kalen Shaffer MD        Subjective:     Principal Problem:Malignant hypertension        HPI:  ED HPI:  Ramirez Cagle is a 77 y.o. male with PMHX of hypertension, diabetes who presents to the emergency department C/O altered mental status.     Patient arrives by EMS after family called because patient was feeling weak.  They state that patient typically goes out in the morning and drinks coffee all day.  He was last known well around 10:30 a.m..  Patient came in around 1:00 p.m. and told family members he was feeling weak.  He reportedly was wearing heavy clothes and felt hot and overheated.  EMS was called and patient arrives here at 1:50 p.m..     In the ED patient is awake but with diminished mentation.  Follows commands.  Does not have focal neurological deficit       Field cbg normal.    IM HPI:  Patient states he has been feeling well in his normal state of health until yesterday he began having lightheadedness felt hot and became confused.  The patient was brought into the emergency department and after a stroke workup was found to have a urinary tract infection.  The patient was hypertensive started initially on IV medications for hypertensive urgency and after improvement he was moved to the floor.  The patient is receiving Rocephin patient states he is back to his baseline today but he has a full plate of food at his bedside and states that he is not hungry.  Patient states he did not sleep well last night is requesting something for sleep aid and anxiety.  Patient does have history of coronary artery disease chart has been reviewed and updated.  Patient is a poor historian, I do not see a troponin in the chart, no reports of CP.  Recent  echocardiogram, ECG noted.      Overview/Hospital Course:  23 FM:  Patient ate 100% of his breakfast this morning and states he is ambulating with some assistance to the restroom.  He is having increasing urinary frequency and some discomfort.  Patient states he has a family  to attend tomorrow and would like to be discharged prior to this.  We are adding antihypertensives today and as long as his blood pressures remained stable we can discharge home early in the morning.      Past Medical History:   Diagnosis Date    Diabetes mellitus type I     Hypertension     Stroke        History reviewed. No pertinent surgical history.    Review of patient's allergies indicates:  No Known Allergies    No current facility-administered medications on file prior to encounter.     Current Outpatient Medications on File Prior to Encounter   Medication Sig    aspirin 81 MG Chew Take 81 mg by mouth once daily.    atorvastatin (LIPITOR) 40 MG tablet Take 40 mg by mouth once daily.    b complex vitamins tablet Take 1 tablet by mouth once daily.    insulin detemir U-100 (LEVEMIR) 100 unit/mL injection Inject into the skin every evening.    irbesartan (AVAPRO) 300 MG tablet Take 300 mg by mouth every evening.    metFORMIN (GLUCOPHAGE) 500 MG tablet Take 500 mg by mouth 2 (two) times daily with meals.    metoprolol tartrate (LOPRESSOR) 25 MG tablet Take 25 mg by mouth 2 (two) times daily.    naproxen (NAPROSYN) 500 MG tablet Take 1 tablet (500 mg total) by mouth 2 (two) times daily with meals.    pseudoephedrine-DM-guaiFENesin (POLY-VENT DM) 60- mg Tab Take 1 tablet by mouth every 6 (six) hours as needed.    timoloL 0.25 % ophthalmic solution 1-2 drops 2 (two) times daily.     Family History    None       Tobacco Use    Smoking status: Never    Smokeless tobacco: Never   Substance and Sexual Activity    Alcohol use: Not Currently    Drug use: Never    Sexual activity: Not Currently     Review of Systems    Constitutional:  Positive for activity change, appetite change, diaphoresis and fatigue. Negative for chills and fever.   HENT:  Negative for ear pain, mouth sores, nosebleeds and sore throat.    Eyes:  Negative for visual disturbance.   Respiratory:  Negative for cough, shortness of breath and wheezing.    Cardiovascular:  Negative for chest pain, palpitations and leg swelling.   Gastrointestinal:  Negative for abdominal distention, abdominal pain, blood in stool, constipation, diarrhea, nausea and vomiting.   Endocrine: Negative for polyphagia.   Genitourinary:  Negative for difficulty urinating, dysuria, flank pain and frequency.   Musculoskeletal:  Negative for arthralgias, back pain and myalgias.   Skin:  Negative for rash.   Neurological:  Negative for dizziness, tremors, seizures, syncope, facial asymmetry, speech difficulty and headaches.   Hematological:  Negative for adenopathy.   Psychiatric/Behavioral:  Positive for confusion. Negative for agitation and hallucinations. The patient is not nervous/anxious.    Objective:     Vital Signs (Most Recent):  Temp: 98.7 °F (37.1 °C) (01/20/23 0729)  Pulse: 60 (01/20/23 0729)  Resp: 20 (01/20/23 0729)  BP: (!) 174/84 (01/20/23 0729)  SpO2: 98 % (01/20/23 0729)   Vital Signs (24h Range):  Temp:  [97.6 °F (36.4 °C)-98.7 °F (37.1 °C)] 98.7 °F (37.1 °C)  Pulse:  [53-95] 60  Resp:  [16-20] 20  SpO2:  [96 %-100 %] 98 %  BP: (150-193)/(70-96) 174/84     Weight: 90.3 kg (199 lb)  Body mass index is 28.55 kg/m².    Physical Exam  Constitutional:       General: He is awake. He is not in acute distress.     Appearance: Normal appearance. He is not ill-appearing, toxic-appearing or diaphoretic.   HENT:      Head: Normocephalic and atraumatic.      Nose: Nose normal. No congestion or rhinorrhea.      Mouth/Throat:      Mouth: Mucous membranes are moist.      Pharynx: Oropharynx is clear. No oropharyngeal exudate or posterior oropharyngeal erythema.   Eyes:      General: No  scleral icterus.        Right eye: No discharge.         Left eye: No discharge.      Extraocular Movements: Extraocular movements intact.      Pupils: Pupils are equal, round, and reactive to light.   Neck:      Thyroid: No thyroid mass or thyromegaly.      Vascular: No carotid bruit.      Meningeal: Brudzinski's sign and Kernig's sign absent.   Cardiovascular:      Rate and Rhythm: Normal rate and regular rhythm.      Chest Wall: PMI is not displaced. No thrill.      Pulses: Normal pulses.      Heart sounds: Normal heart sounds. No murmur heard.    No friction rub. No gallop.   Pulmonary:      Effort: Pulmonary effort is normal. No tachypnea, accessory muscle usage, prolonged expiration or respiratory distress.      Breath sounds: Normal breath sounds. No stridor or decreased air movement. No wheezing, rhonchi or rales.   Chest:      Chest wall: No tenderness.   Abdominal:      General: Bowel sounds are normal. There is no distension.      Palpations: Abdomen is soft. There is no hepatomegaly, splenomegaly or mass.      Tenderness: There is no abdominal tenderness. There is no right CVA tenderness, left CVA tenderness, guarding or rebound.      Hernia: No hernia is present.   Musculoskeletal:         General: No swelling, tenderness, deformity or signs of injury.      Cervical back: Neck supple. No rigidity. No muscular tenderness.      Right lower leg: No edema.      Left lower leg: No edema.   Lymphadenopathy:      Cervical: No cervical adenopathy.   Skin:     General: Skin is warm.      Capillary Refill: Capillary refill takes less than 2 seconds.      Coloration: Skin is not cyanotic, jaundiced or pale.      Findings: No bruising, erythema, lesion, petechiae or rash.   Neurological:      General: No focal deficit present.      Mental Status: He is alert and oriented to person, place, and time. Mental status is at baseline.      Cranial Nerves: No cranial nerve deficit, dysarthria or facial asymmetry.       Motor: No weakness or tremor.   Psychiatric:         Mood and Affect: Mood normal. Mood is not anxious or depressed. Affect is not labile or flat.         Speech: Speech is not rapid and pressured or slurred.         Behavior: Behavior normal. Behavior is not agitated, aggressive or combative.         Thought Content: Thought content normal. Thought content is not paranoid or delusional.         Cognition and Memory: Cognition is not impaired. Memory is not impaired.         Judgment: Judgment normal. Judgment is not impulsive or inappropriate.       CRANIAL NERVES     CN III, IV, VI   Pupils are equal, round, and reactive to light.     Significant Labs: CBC:   Recent Labs   Lab 01/18/23  1404 01/19/23  0555   WBC 7.01 6.33   HGB 11.4* 11.9*   HCT 34.9* 35.8*   * 124*       CMP:   Recent Labs   Lab 01/18/23  1404 01/19/23  0555    142   K 4.0 3.4*    110   CO2 29 27   * 133*   BUN 19 13   CREATININE 1.6* 1.3   CALCIUM 7.9* 8.3*   PROT 7.5  --    ALBUMIN 3.3*  --    BILITOT 0.7  --    ALKPHOS 61  --    AST 32  --    ALT 30  --    ANIONGAP 4* 5*       TSH:   Recent Labs   Lab 01/18/23  1404   TSH 3.390       Urine Studies:   Recent Labs   Lab 01/18/23  1529   COLORU Yellow   APPEARANCEUA Clear   PHUR 7.0   SPECGRAV 1.015   PROTEINUA 1+*   GLUCUA 2+*   KETONESU Negative   BILIRUBINUA Negative   OCCULTUA 1+*   NITRITE Negative   UROBILINOGEN Negative   LEUKOCYTESUR Negative   RBCUA 5*   WBCUA 9*   BACTERIA Many*   SQUAMEPITHEL 1   HYALINECASTS 0.8*         Significant Imaging: I have reviewed all pertinent imaging results/findings within the past 24 hours.      Assessment/Plan:      * Malignant hypertension    Add agents PRN and titrate.  Adding bidil today.    Acute cystitis without hematuria  Abx, close monitoring      Type 2 diabetes mellitus without complication, with long-term current use of insulin  Patient's FSGs are controlled on current medication regimen.  Last A1c reviewed-   Lab  Results   Component Value Date    HGBA1C 6.7 (H) 01/18/2023     Most recent fingerstick glucose reviewed-   Recent Labs   Lab 01/19/23  1132   POCTGLUCOSE 182*     Current correctional scale  High  Maintain anti-hyperglycemic dose as follows-   Antihyperglycemics (From admission, onward)    Start     Stop Route Frequency Ordered    01/18/23 1807  insulin aspart U-100 pen 0-5 Units         -- SubQ Before meals & nightly PRN 01/18/23 1707        Hold Oral hypoglycemics while patient is in the hospital.    Coronary artery disease involving native coronary artery of native heart without angina pectoris    Check trop    Acute encephalopathy  ? UTI vs. BP, iglesias noted, imaging reviewed, recent echo with [facility].        VTE Risk Mitigation (From admission, onward)         Ordered     IP VTE HIGH RISK PATIENT  Once         01/18/23 1928     Place sequential compression device  Until discontinued         01/18/23 1928                Discharge Planning   MEDINA:      Code Status: Full Code   Is the patient medically ready for discharge?:     Reason for patient still in hospital (select all that apply): Patient trending condition  Discharge Plan A: Home, Home with family                  Juan C Gallego III, MD  Department of Hospital Medicine   [facility]

## 2023-01-20 NOTE — SUBJECTIVE & OBJECTIVE
Past Medical History:   Diagnosis Date    Diabetes mellitus type I     Hypertension     Stroke        History reviewed. No pertinent surgical history.    Review of patient's allergies indicates:  No Known Allergies    No current facility-administered medications on file prior to encounter.     Current Outpatient Medications on File Prior to Encounter   Medication Sig    aspirin 81 MG Chew Take 81 mg by mouth once daily.    atorvastatin (LIPITOR) 40 MG tablet Take 40 mg by mouth once daily.    b complex vitamins tablet Take 1 tablet by mouth once daily.    insulin detemir U-100 (LEVEMIR) 100 unit/mL injection Inject into the skin every evening.    irbesartan (AVAPRO) 300 MG tablet Take 300 mg by mouth every evening.    metFORMIN (GLUCOPHAGE) 500 MG tablet Take 500 mg by mouth 2 (two) times daily with meals.    metoprolol tartrate (LOPRESSOR) 25 MG tablet Take 25 mg by mouth 2 (two) times daily.    naproxen (NAPROSYN) 500 MG tablet Take 1 tablet (500 mg total) by mouth 2 (two) times daily with meals.    pseudoephedrine-DM-guaiFENesin (POLY-VENT DM) 60- mg Tab Take 1 tablet by mouth every 6 (six) hours as needed.    timoloL 0.25 % ophthalmic solution 1-2 drops 2 (two) times daily.     Family History    None       Tobacco Use    Smoking status: Never    Smokeless tobacco: Never   Substance and Sexual Activity    Alcohol use: Not Currently    Drug use: Never    Sexual activity: Not Currently     Review of Systems   Constitutional:  Positive for activity change, appetite change, diaphoresis and fatigue. Negative for chills and fever.   HENT:  Negative for ear pain, mouth sores, nosebleeds and sore throat.    Eyes:  Negative for visual disturbance.   Respiratory:  Negative for cough, shortness of breath and wheezing.    Cardiovascular:  Negative for chest pain, palpitations and leg swelling.   Gastrointestinal:  Negative for abdominal distention, abdominal pain, blood in stool, constipation, diarrhea, nausea and  vomiting.   Endocrine: Negative for polyphagia.   Genitourinary:  Negative for difficulty urinating, dysuria, flank pain and frequency.   Musculoskeletal:  Negative for arthralgias, back pain and myalgias.   Skin:  Negative for rash.   Neurological:  Negative for dizziness, tremors, seizures, syncope, facial asymmetry, speech difficulty and headaches.   Hematological:  Negative for adenopathy.   Psychiatric/Behavioral:  Positive for confusion. Negative for agitation and hallucinations. The patient is not nervous/anxious.    Objective:     Vital Signs (Most Recent):  Temp: 98.7 °F (37.1 °C) (01/20/23 0729)  Pulse: 60 (01/20/23 0729)  Resp: 20 (01/20/23 0729)  BP: (!) 174/84 (01/20/23 0729)  SpO2: 98 % (01/20/23 0729)   Vital Signs (24h Range):  Temp:  [97.6 °F (36.4 °C)-98.7 °F (37.1 °C)] 98.7 °F (37.1 °C)  Pulse:  [53-95] 60  Resp:  [16-20] 20  SpO2:  [96 %-100 %] 98 %  BP: (150-193)/(70-96) 174/84     Weight: 90.3 kg (199 lb)  Body mass index is 28.55 kg/m².    Physical Exam  Constitutional:       General: He is awake. He is not in acute distress.     Appearance: Normal appearance. He is not ill-appearing, toxic-appearing or diaphoretic.   HENT:      Head: Normocephalic and atraumatic.      Nose: Nose normal. No congestion or rhinorrhea.      Mouth/Throat:      Mouth: Mucous membranes are moist.      Pharynx: Oropharynx is clear. No oropharyngeal exudate or posterior oropharyngeal erythema.   Eyes:      General: No scleral icterus.        Right eye: No discharge.         Left eye: No discharge.      Extraocular Movements: Extraocular movements intact.      Pupils: Pupils are equal, round, and reactive to light.   Neck:      Thyroid: No thyroid mass or thyromegaly.      Vascular: No carotid bruit.      Meningeal: Brudzinski's sign and Kernig's sign absent.   Cardiovascular:      Rate and Rhythm: Normal rate and regular rhythm.      Chest Wall: PMI is not displaced. No thrill.      Pulses: Normal pulses.      Heart  sounds: Normal heart sounds. No murmur heard.    No friction rub. No gallop.   Pulmonary:      Effort: Pulmonary effort is normal. No tachypnea, accessory muscle usage, prolonged expiration or respiratory distress.      Breath sounds: Normal breath sounds. No stridor or decreased air movement. No wheezing, rhonchi or rales.   Chest:      Chest wall: No tenderness.   Abdominal:      General: Bowel sounds are normal. There is no distension.      Palpations: Abdomen is soft. There is no hepatomegaly, splenomegaly or mass.      Tenderness: There is no abdominal tenderness. There is no right CVA tenderness, left CVA tenderness, guarding or rebound.      Hernia: No hernia is present.   Musculoskeletal:         General: No swelling, tenderness, deformity or signs of injury.      Cervical back: Neck supple. No rigidity. No muscular tenderness.      Right lower leg: No edema.      Left lower leg: No edema.   Lymphadenopathy:      Cervical: No cervical adenopathy.   Skin:     General: Skin is warm.      Capillary Refill: Capillary refill takes less than 2 seconds.      Coloration: Skin is not cyanotic, jaundiced or pale.      Findings: No bruising, erythema, lesion, petechiae or rash.   Neurological:      General: No focal deficit present.      Mental Status: He is alert and oriented to person, place, and time. Mental status is at baseline.      Cranial Nerves: No cranial nerve deficit, dysarthria or facial asymmetry.      Motor: No weakness or tremor.   Psychiatric:         Mood and Affect: Mood normal. Mood is not anxious or depressed. Affect is not labile or flat.         Speech: Speech is not rapid and pressured or slurred.         Behavior: Behavior normal. Behavior is not agitated, aggressive or combative.         Thought Content: Thought content normal. Thought content is not paranoid or delusional.         Cognition and Memory: Cognition is not impaired. Memory is not impaired.         Judgment: Judgment normal.  Judgment is not impulsive or inappropriate.       CRANIAL NERVES     CN III, IV, VI   Pupils are equal, round, and reactive to light.     Significant Labs: CBC:   Recent Labs   Lab 01/18/23  1404 01/19/23  0555   WBC 7.01 6.33   HGB 11.4* 11.9*   HCT 34.9* 35.8*   * 124*       CMP:   Recent Labs   Lab 01/18/23  1404 01/19/23  0555    142   K 4.0 3.4*    110   CO2 29 27   * 133*   BUN 19 13   CREATININE 1.6* 1.3   CALCIUM 7.9* 8.3*   PROT 7.5  --    ALBUMIN 3.3*  --    BILITOT 0.7  --    ALKPHOS 61  --    AST 32  --    ALT 30  --    ANIONGAP 4* 5*       TSH:   Recent Labs   Lab 01/18/23  1404   TSH 3.390       Urine Studies:   Recent Labs   Lab 01/18/23  1529   COLORU Yellow   APPEARANCEUA Clear   PHUR 7.0   SPECGRAV 1.015   PROTEINUA 1+*   GLUCUA 2+*   KETONESU Negative   BILIRUBINUA Negative   OCCULTUA 1+*   NITRITE Negative   UROBILINOGEN Negative   LEUKOCYTESUR Negative   RBCUA 5*   WBCUA 9*   BACTERIA Many*   SQUAMEPITHEL 1   HYALINECASTS 0.8*         Significant Imaging: I have reviewed all pertinent imaging results/findings within the past 24 hours.

## 2023-01-20 NOTE — ASSESSMENT & PLAN NOTE
Patient's FSGs are controlled on current medication regimen.  Last A1c reviewed-   Lab Results   Component Value Date    HGBA1C 6.7 (H) 01/18/2023     Most recent fingerstick glucose reviewed-   Recent Labs   Lab 01/19/23  1132   POCTGLUCOSE 182*     Current correctional scale  High  Maintain anti-hyperglycemic dose as follows-   Antihyperglycemics (From admission, onward)    Start     Stop Route Frequency Ordered    01/18/23 1807  insulin aspart U-100 pen 0-5 Units         -- SubQ Before meals & nightly PRN 01/18/23 1707        Hold Oral hypoglycemics while patient is in the hospital.

## 2023-01-21 VITALS
SYSTOLIC BLOOD PRESSURE: 128 MMHG | TEMPERATURE: 98 F | HEART RATE: 62 BPM | RESPIRATION RATE: 18 BRPM | BODY MASS INDEX: 28.49 KG/M2 | OXYGEN SATURATION: 98 % | WEIGHT: 199 LBS | HEIGHT: 70 IN | DIASTOLIC BLOOD PRESSURE: 66 MMHG

## 2023-01-21 LAB
ALBUMIN SERPL BCP-MCNC: 3.1 G/DL (ref 3.5–5.2)
ALP SERPL-CCNC: 60 U/L (ref 55–135)
ALT SERPL W/O P-5'-P-CCNC: 21 U/L (ref 10–44)
ANION GAP SERPL CALC-SCNC: 4 MMOL/L (ref 8–16)
AST SERPL-CCNC: 20 U/L (ref 10–40)
BASOPHILS # BLD AUTO: 0.02 K/UL (ref 0–0.2)
BASOPHILS NFR BLD: 0.3 % (ref 0–1.9)
BILIRUB SERPL-MCNC: 0.8 MG/DL (ref 0.1–1)
BUN SERPL-MCNC: 11 MG/DL (ref 8–23)
CALCIUM SERPL-MCNC: 8.2 MG/DL (ref 8.7–10.5)
CHLORIDE SERPL-SCNC: 106 MMOL/L (ref 95–110)
CO2 SERPL-SCNC: 29 MMOL/L (ref 23–29)
CREAT SERPL-MCNC: 1.4 MG/DL (ref 0.5–1.4)
DIFFERENTIAL METHOD: ABNORMAL
EOSINOPHIL # BLD AUTO: 0.1 K/UL (ref 0–0.5)
EOSINOPHIL NFR BLD: 1.4 % (ref 0–8)
ERYTHROCYTE [DISTWIDTH] IN BLOOD BY AUTOMATED COUNT: 13.9 % (ref 11.5–14.5)
EST. GFR  (NO RACE VARIABLE): 52.1 ML/MIN/1.73 M^2
GLUCOSE SERPL-MCNC: 172 MG/DL (ref 70–110)
HCT VFR BLD AUTO: 33.9 % (ref 40–54)
HGB BLD-MCNC: 11.5 G/DL (ref 14–18)
IMM GRANULOCYTES # BLD AUTO: 0.03 K/UL (ref 0–0.04)
IMM GRANULOCYTES NFR BLD AUTO: 0.5 % (ref 0–0.5)
LYMPHOCYTES # BLD AUTO: 1.2 K/UL (ref 1–4.8)
LYMPHOCYTES NFR BLD: 19 % (ref 18–48)
MCH RBC QN AUTO: 30.7 PG (ref 27–31)
MCHC RBC AUTO-ENTMCNC: 33.9 G/DL (ref 32–36)
MCV RBC AUTO: 90 FL (ref 82–98)
MONOCYTES # BLD AUTO: 0.8 K/UL (ref 0.3–1)
MONOCYTES NFR BLD: 12.4 % (ref 4–15)
NEUTROPHILS # BLD AUTO: 4.3 K/UL (ref 1.8–7.7)
NEUTROPHILS NFR BLD: 66.4 % (ref 38–73)
NRBC BLD-RTO: 0 /100 WBC
PLATELET # BLD AUTO: 119 K/UL (ref 150–450)
PMV BLD AUTO: 11.6 FL (ref 9.2–12.9)
POTASSIUM SERPL-SCNC: 3.1 MMOL/L (ref 3.5–5.1)
PROT SERPL-MCNC: 6.8 G/DL (ref 6–8.4)
RBC # BLD AUTO: 3.75 M/UL (ref 4.6–6.2)
SODIUM SERPL-SCNC: 139 MMOL/L (ref 136–145)
WBC # BLD AUTO: 6.53 K/UL (ref 3.9–12.7)

## 2023-01-21 PROCEDURE — 80053 COMPREHEN METABOLIC PANEL: CPT | Performed by: INTERNAL MEDICINE

## 2023-01-21 PROCEDURE — 99239 PR HOSPITAL DISCHARGE DAY,>30 MIN: ICD-10-PCS | Mod: ,,, | Performed by: STUDENT IN AN ORGANIZED HEALTH CARE EDUCATION/TRAINING PROGRAM

## 2023-01-21 PROCEDURE — 25000003 PHARM REV CODE 250: Performed by: EMERGENCY MEDICINE

## 2023-01-21 PROCEDURE — 96366 THER/PROPH/DIAG IV INF ADDON: CPT

## 2023-01-21 PROCEDURE — 25000003 PHARM REV CODE 250: Performed by: INTERNAL MEDICINE

## 2023-01-21 PROCEDURE — 99900035 HC TECH TIME PER 15 MIN (STAT)

## 2023-01-21 PROCEDURE — 85025 COMPLETE CBC W/AUTO DIFF WBC: CPT | Performed by: INTERNAL MEDICINE

## 2023-01-21 PROCEDURE — 99900031 HC PATIENT EDUCATION (STAT)

## 2023-01-21 PROCEDURE — 94761 N-INVAS EAR/PLS OXIMETRY MLT: CPT

## 2023-01-21 PROCEDURE — G0378 HOSPITAL OBSERVATION PER HR: HCPCS

## 2023-01-21 PROCEDURE — 63600175 PHARM REV CODE 636 W HCPCS: Performed by: INTERNAL MEDICINE

## 2023-01-21 PROCEDURE — 99239 HOSP IP/OBS DSCHRG MGMT >30: CPT | Mod: ,,, | Performed by: STUDENT IN AN ORGANIZED HEALTH CARE EDUCATION/TRAINING PROGRAM

## 2023-01-21 PROCEDURE — 36415 COLL VENOUS BLD VENIPUNCTURE: CPT | Performed by: INTERNAL MEDICINE

## 2023-01-21 RX ORDER — CEFDINIR 300 MG/1
300 CAPSULE ORAL 2 TIMES DAILY
Qty: 12 CAPSULE | Refills: 0 | Status: SHIPPED | OUTPATIENT
Start: 2023-01-21 | End: 2023-01-27

## 2023-01-21 RX ORDER — TIMOLOL MALEATE 5 MG/ML
1 SOLUTION/ DROPS OPHTHALMIC 2 TIMES DAILY
Qty: 15 ML | Refills: 0 | Status: SHIPPED | OUTPATIENT
Start: 2023-01-21 | End: 2023-02-20

## 2023-01-21 RX ORDER — AMLODIPINE BESYLATE 10 MG/1
10 TABLET ORAL DAILY
Qty: 30 TABLET | Refills: 0 | Status: SHIPPED | OUTPATIENT
Start: 2023-01-22 | End: 2023-03-19

## 2023-01-21 RX ORDER — ISOSORBIDE DINITRATE AND HYDRALAZINE HYDROCHLORIDE 37.5; 2 MG/1; MG/1
1 TABLET ORAL 3 TIMES DAILY
Qty: 90 TABLET | Refills: 0 | Status: SHIPPED | OUTPATIENT
Start: 2023-01-21 | End: 2023-02-20

## 2023-01-21 RX ADMIN — TIMOLOL MALEATE 1 DROP: 5 SOLUTION/ DROPS OPHTHALMIC at 08:01

## 2023-01-21 RX ADMIN — CEFTRIAXONE SODIUM 1 G: 1 INJECTION, POWDER, FOR SOLUTION INTRAMUSCULAR; INTRAVENOUS at 08:01

## 2023-01-21 RX ADMIN — AMLODIPINE BESYLATE 10 MG: 10 TABLET ORAL at 08:01

## 2023-01-21 RX ADMIN — ISOSORBIDE DINITRATE: 20 TABLET ORAL at 08:01

## 2023-01-21 RX ADMIN — LOSARTAN POTASSIUM 100 MG: 50 TABLET, FILM COATED ORAL at 08:01

## 2023-01-21 RX ADMIN — METOPROLOL TARTRATE 25 MG: 25 TABLET, FILM COATED ORAL at 08:01

## 2023-01-21 RX ADMIN — ASPIRIN 81 MG CHEWABLE TABLET 81 MG: 81 TABLET CHEWABLE at 08:01

## 2023-01-21 RX ADMIN — ATORVASTATIN CALCIUM 40 MG: 40 TABLET, FILM COATED ORAL at 08:01

## 2023-01-21 NOTE — ASSESSMENT & PLAN NOTE
Abx, close monitoring.  1/21 Tolerating PO intake, patient afebrile, no cultures pending at this time. Will transition to PO antibiotics and continue therapy for total of 10 days.

## 2023-01-21 NOTE — ASSESSMENT & PLAN NOTE
? UTI vs. BP, iglesias noted, imaging reviewed, recent echo with [facility].  1/21 per patient he feels back to his baseline.

## 2023-01-21 NOTE — PLAN OF CARE
[place] [facility]  Discharge Final Note    Primary Care Provider: Kalen Shaffer MD    Expected Discharge Date: 1/21/2023    Final Discharge Note (most recent)       Final Note - 01/21/23 1142          Final Note    Assessment Type Final Discharge Note     Anticipated Discharge Disposition Home or Self Care     Hospital Resources/Appts/Education Provided Provided patient/caregiver with written discharge plan information        Post-Acute Status    Discharge Delays None known at this time                     Important Message from Medicare             Contact Info       Kalen Shaffer MD   Specialty: Internal Medicine   Relationship: PCP - General    [address] [place] [id number]   Phone: [phone]       Next Steps: Follow up in 3 day(s)        Patient Discharging to home.  Nurse to give written discharge instructions.  Patient to FU with Dr. Shaffer in 3 days, office is closed at this time, unable to schedule appointment for patient.  Patient to call for appointment.

## 2023-01-21 NOTE — SUBJECTIVE & OBJECTIVE
Interval History: Patient seen and examined. In no acute distress, no new complaints.     Review of Systems   Constitutional:  Negative for activity change, appetite change, chills, diaphoresis, fatigue and fever.   HENT:  Negative for ear pain, mouth sores, nosebleeds and sore throat.    Eyes:  Negative for visual disturbance.   Respiratory:  Negative for cough, shortness of breath and wheezing.    Cardiovascular:  Negative for chest pain, palpitations and leg swelling.   Gastrointestinal:  Negative for abdominal distention, abdominal pain, blood in stool, constipation, diarrhea, nausea and vomiting.   Endocrine: Negative for polyphagia.   Genitourinary:  Negative for difficulty urinating, dysuria, flank pain and frequency.   Musculoskeletal:  Negative for arthralgias, back pain and myalgias.   Skin:  Negative for rash.   Neurological:  Negative for dizziness, tremors, seizures, syncope, facial asymmetry, speech difficulty and headaches.   Hematological:  Negative for adenopathy.   Psychiatric/Behavioral:  Positive for dysphoric mood. Negative for agitation, confusion and hallucinations. The patient is not nervous/anxious.    Objective:     Vital Signs (Most Recent):  Temp: 98 °F (36.7 °C) (01/21/23 1048)  Pulse: 62 (01/21/23 1048)  Resp: 18 (01/21/23 1048)  BP: 128/66 (01/21/23 1048)  SpO2: 98 % (01/21/23 1048) Vital Signs (24h Range):  Temp:  [97.7 °F (36.5 °C)-98.9 °F (37.2 °C)] 98 °F (36.7 °C)  Pulse:  [61-81] 62  Resp:  [16-20] 18  SpO2:  [96 %-98 %] 98 %  BP: (128-157)/(64-81) 128/66     Weight: 90.3 kg (199 lb)  Body mass index is 28.55 kg/m².    Intake/Output Summary (Last 24 hours) at 1/21/2023 1114  Last data filed at 1/21/2023 0510  Gross per 24 hour   Intake 1746 ml   Output 1750 ml   Net -4 ml      Physical Exam  Constitutional:       General: He is awake. He is not in acute distress.     Appearance: Normal appearance. He is not ill-appearing, toxic-appearing or diaphoretic.   HENT:      Head:  Normocephalic and atraumatic.      Nose: Nose normal. No congestion or rhinorrhea.      Mouth/Throat:      Mouth: Mucous membranes are moist.      Pharynx: Oropharynx is clear. No oropharyngeal exudate or posterior oropharyngeal erythema.   Eyes:      General: No scleral icterus.        Right eye: No discharge.         Left eye: No discharge.      Extraocular Movements: Extraocular movements intact.      Pupils: Pupils are equal, round, and reactive to light.   Neck:      Thyroid: No thyroid mass or thyromegaly.      Vascular: No carotid bruit.      Meningeal: Brudzinski's sign and Kernig's sign absent.   Cardiovascular:      Rate and Rhythm: Normal rate and regular rhythm.      Chest Wall: PMI is not displaced. No thrill.      Pulses: Normal pulses.      Heart sounds: Normal heart sounds. No murmur heard.    No friction rub. No gallop.   Pulmonary:      Effort: Pulmonary effort is normal. No tachypnea, accessory muscle usage, prolonged expiration or respiratory distress.      Breath sounds: Normal breath sounds. No stridor or decreased air movement. No wheezing, rhonchi or rales.   Chest:      Chest wall: No tenderness.   Abdominal:      General: Bowel sounds are normal. There is no distension.      Palpations: Abdomen is soft. There is no hepatomegaly, splenomegaly or mass.      Tenderness: There is no abdominal tenderness. There is no right CVA tenderness, left CVA tenderness, guarding or rebound.      Hernia: No hernia is present.   Musculoskeletal:         General: No swelling, tenderness, deformity or signs of injury.      Cervical back: Neck supple. No rigidity. No muscular tenderness.      Right lower leg: No edema.      Left lower leg: No edema.   Lymphadenopathy:      Cervical: No cervical adenopathy.   Skin:     General: Skin is warm.      Capillary Refill: Capillary refill takes less than 2 seconds.      Coloration: Skin is not cyanotic, jaundiced or pale.      Findings: No bruising, erythema, lesion,  petechiae or rash.   Neurological:      General: No focal deficit present.      Mental Status: He is alert and oriented to person, place, and time. Mental status is at baseline.      Cranial Nerves: No cranial nerve deficit, dysarthria or facial asymmetry.      Motor: No weakness or tremor.   Psychiatric:         Mood and Affect: Mood normal. Mood is not anxious or depressed. Affect is not labile or flat.         Speech: Speech is not rapid and pressured or slurred.         Behavior: Behavior normal. Behavior is not agitated, aggressive or combative.         Thought Content: Thought content normal. Thought content is not paranoid or delusional.         Cognition and Memory: Cognition is not impaired. Memory is not impaired.         Judgment: Judgment normal. Judgment is not impulsive or inappropriate.       Significant Labs: All pertinent labs within the past 24 hours have been reviewed.  Blood Culture: No results for input(s): LABBLOO in the last 48 hours.  BMP:   Recent Labs   Lab 01/21/23  0526   *      K 3.1*      CO2 29   BUN 11   CREATININE 1.4   CALCIUM 8.2*     CBC:   Recent Labs   Lab 01/21/23  0526   WBC 6.53   HGB 11.5*   HCT 33.9*   *     CMP:   Recent Labs   Lab 01/21/23  0526      K 3.1*      CO2 29   *   BUN 11   CREATININE 1.4   CALCIUM 8.2*   PROT 6.8   ALBUMIN 3.1*   BILITOT 0.8   ALKPHOS 60   AST 20   ALT 21   ANIONGAP 4*     CTA Neck  Narrative: EXAMINATION:  CTA NECK    CLINICAL HISTORY:  Fatigue, altered mental status    CT/nuclear cardiac exams in previous 12 months: 1    TECHNIQUE:  Axial CT images were obtained following administration of intravenous contrast and evaluated along with multiple 3-D and MIP reconstructions. Iterative reconstruction technique was used.    COMPARISON:  None    FINDINGS:  Both common carotid arteries are patent to the bifurcations.  A calcified atherosclerotic plaque at the distal aspect of the left common carotid  artery does not result in significant luminal stenosis.  There are mild calcified atherosclerotic plaques at the right carotid bulb.  Both internal carotid arteries are tortuous but patent without considerable stenosis.  Bilateral external carotid arteries are patent.  Both vertebral arteries are patent without considerable stenosis.  No evidence of aneurysm or dissection.  There is multilevel cervical spondylosis as well as anterior fusion hardware across C4-C5.  Impression: Patency of the major arteries within the neck without evidence of aneurysm, dissection or significant stenosis.    Electronically signed by: Ayo Gibbs MD  Date:    01/18/2023  Time:    15:56  CTA Head  Narrative: EXAMINATION:  CTA HEAD    CLINICAL HISTORY:  Fatigue    CT/nuclear cardiac exams in previous 12 months: 1    TECHNIQUE:  Axial CT images were obtained following administration of intravenous contrast and evaluated with multiplanar MIP reformatted images.  Iterative reconstruction technique was used.    COMPARISON:  None    FINDINGS:  Intracranial portions of the bilateral internal carotid arteries are patent and contain scattered calcified atherosclerotic plaques at the cavernous/clinoid regions.  The bilateral middle, anterior and posterior cerebral arteries are patent without considerable stenosis.  Basilar artery is patent without considerable stenosis.  No aneurysm identified.  Tertiary vessels enhance symmetrically.  Impression: Patency of the major intracranial arteries without evidence of aneurysm or considerable stenosis.    Electronically signed by: Ayo Gibbs MD  Date:    01/18/2023  Time:    15:47  X-Ray Chest AP Portable  Narrative: EXAMINATION:  XR CHEST AP PORTABLE    CLINICAL HISTORY:  Stroke;    COMPARISON:  03/16/2020    FINDINGS:  Cardiac silhouette is prominent, possibly accentuated by technique.  There is sternotomy change.  Thoracic aorta is calcified.  No focal infiltrate or pleural effusion.  There is gaseous  distention of the stomach.  Impression: No evidence of an acute pulmonary process.    Electronically signed by: Ayo Gibbs MD  Date:    01/18/2023  Time:    14:49  CT Head Without Contrast  Narrative: EXAMINATION:  CT HEAD WITHOUT CONTRAST    CLINICAL HISTORY:  Neuro deficit, acute, stroke suspected;    CT/Cardiac Nuclear exams in prior 12 months: 0    TECHNIQUE:  Axial head CT performed without IV contrast.  Iterative reconstruction utilized.    COMPARISON:  CT head 03/26/2019    FINDINGS:  No evidence of an acute intracranial hemorrhage, infarct or mass effect.    Multifocal areas of encephalomalacia involving portions of left frontal, parietal lobes, as before.  Patchy areas of decreased white matter attenuation, similar.  Mild cerebral volume loss, not advanced for age.  ICA and vertebral calcifications.  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

## 2023-01-21 NOTE — DISCHARGE SUMMARY
[facility] Medicine  Discharge Summary      Patient Name: Ramirez Cagle  MRN: [id number]  [facility]: [id number]  Patient Class: OP- Observation  Admission Date: 1/18/2023  Hospital Length of Stay: 0 days  Discharge Date and Time:  01/21/2023 11:28 AM  Attending Physician: Juan C Gallego III, MD   Discharging Provider: Ml Niño DO  Primary Care Provider: Kalen Shaffer MD    Primary Care Team: Networked reference to record PCT     HPI:   ED HPI:  Ramirez Cagle is a 77 y.o. male with PMHX of hypertension, diabetes who presents to the emergency department C/O altered mental status.     Patient arrives by EMS after family called because patient was feeling weak.  They state that patient typically goes out in the morning and drinks coffee all day.  He was last known well around 10:30 a.m..  Patient came in around 1:00 p.m. and told family members he was feeling weak.  He reportedly was wearing heavy clothes and felt hot and overheated.  EMS was called and patient arrives here at 1:50 p.m..     In the ED patient is awake but with diminished mentation.  Follows commands.  Does not have focal neurological deficit       Field cbg normal.    IM HPI:  Patient states he has been feeling well in his normal state of health until yesterday he began having lightheadedness felt hot and became confused.  The patient was brought into the emergency department and after a stroke workup was found to have a urinary tract infection.  The patient was hypertensive started initially on IV medications for hypertensive urgency and after improvement he was moved to the floor.  The patient is receiving Rocephin patient states he is back to his baseline today but he has a full plate of food at his bedside and states that he is not hungry.  Patient states he did not sleep well last night is requesting something for sleep aid and anxiety.  Patient does have history of coronary artery disease chart has been reviewed  and updated.  Patient is a poor historian, I do not see a troponin in the chart, no reports of CP.  Recent echocardiogram, ECG noted.      * No surgery found *      Hospital Course:   23 FM:  Patient ate 100% of his breakfast this morning and states he is ambulating with some assistance to the restroom.  He is having increasing urinary frequency and some discomfort.  Patient states he has a family  to attend tomorrow and would like to be discharged prior to this.  We are adding antihypertensives today and as long as his blood pressures remained stable we can discharge home early in the morning.  23 [place]: No acute events overnight. Afebrile. Per patient back to baseline function. BP normotensive after adjustments made yesterday.        Goals of Care Treatment Preferences:  Code Status: Full Code      Consults:   Consults (From admission, onward)        Status Ordering Provider     Consult to Telemedicine - Acute Stroke  Once        Provider:  Bong Iverson MD    Completed AB MCCANN          * Malignant hypertension  Add agents PRN and titrate.  Adding bidil today.    Acute cystitis without hematuria  Abx, close monitoring.   Tolerating PO intake, patient afebrile, no cultures pending at this time. Will transition to PO antibiotics and continue therapy for total of 10 days.       Type 2 diabetes mellitus without complication, with long-term current use of insulin  Patient's FSGs are controlled on current medication regimen.  Last A1c reviewed-   Lab Results   Component Value Date    HGBA1C 6.7 (H) 2023     Most recent fingerstick glucose reviewed-   No results for input(s): POCTGLUCOSE in the last 24 hours.  Current correctional scale  High  Maintain anti-hyperglycemic dose as follows-   Antihyperglycemics (From admission, onward)    Start     Stop Route Frequency Ordered    23 1807  insulin aspart U-100 pen 0-5 Units         -- SubQ Before meals & nightly PRN 23 1707         Hold Oral hypoglycemics while patient is in the hospital.    Coronary artery disease involving native coronary artery of native heart without angina pectoris  Check trop    Acute encephalopathy  ? UTI vs. BP, iglesias noted, imaging reviewed, recent echo with [facility].  1/21 per patient he feels back to his baseline.         Final Active Diagnoses:    Diagnosis Date Noted POA    PRINCIPAL PROBLEM:  Malignant hypertension [I10] 01/19/2023 Yes    Coronary artery disease involving native coronary artery of native heart without angina pectoris [I25.10] 01/19/2023 Yes    Type 2 diabetes mellitus without complication, with long-term current use of insulin [E11.9, Z79.4] 01/19/2023 Not Applicable    Acute cystitis without hematuria [N30.00] 01/19/2023 Yes    Acute encephalopathy [G93.40] 01/18/2023 Yes      Problems Resolved During this Admission:       Discharged Condition: stable    Disposition: Home or Self Care    Follow Up:   Follow-up Information     Kalen Shaffer MD Follow up in 3 day(s).    Specialty: Internal Medicine  Contact information:  [address] [place] [id number]  [phone]                       Patient Instructions:   No discharge procedures on file.    Significant Diagnostic Studies: Labs:   BMP:   Recent Labs   Lab 01/21/23  0526   *      K 3.1*      CO2 29   BUN 11   CREATININE 1.4   CALCIUM 8.2*   , CMP   Recent Labs   Lab 01/21/23  0526      K 3.1*      CO2 29   *   BUN 11   CREATININE 1.4   CALCIUM 8.2*   PROT 6.8   ALBUMIN 3.1*   BILITOT 0.8   ALKPHOS 60   AST 20   ALT 21   ANIONGAP 4*    and CBC   Recent Labs   Lab 01/21/23  0526   WBC 6.53   HGB 11.5*   HCT 33.9*   *     CTA Neck  Narrative: EXAMINATION:  CTA NECK    CLINICAL HISTORY:  Fatigue, altered mental status    CT/nuclear cardiac exams in previous 12 months: 1    TECHNIQUE:  Axial CT images were obtained following administration of intravenous contrast and evaluated along  with multiple 3-D and MIP reconstructions. Iterative reconstruction technique was used.    COMPARISON:  None    FINDINGS:  Both common carotid arteries are patent to the bifurcations.  A calcified atherosclerotic plaque at the distal aspect of the left common carotid artery does not result in significant luminal stenosis.  There are mild calcified atherosclerotic plaques at the right carotid bulb.  Both internal carotid arteries are tortuous but patent without considerable stenosis.  Bilateral external carotid arteries are patent.  Both vertebral arteries are patent without considerable stenosis.  No evidence of aneurysm or dissection.  There is multilevel cervical spondylosis as well as anterior fusion hardware across C4-C5.  Impression: Patency of the major arteries within the neck without evidence of aneurysm, dissection or significant stenosis.    Electronically signed by: Ayo Gibbs MD  Date:    01/18/2023  Time:    15:56  CTA Head  Narrative: EXAMINATION:  CTA HEAD    CLINICAL HISTORY:  Fatigue    CT/nuclear cardiac exams in previous 12 months: 1    TECHNIQUE:  Axial CT images were obtained following administration of intravenous contrast and evaluated with multiplanar MIP reformatted images.  Iterative reconstruction technique was used.    COMPARISON:  None    FINDINGS:  Intracranial portions of the bilateral internal carotid arteries are patent and contain scattered calcified atherosclerotic plaques at the cavernous/clinoid regions.  The bilateral middle, anterior and posterior cerebral arteries are patent without considerable stenosis.  Basilar artery is patent without considerable stenosis.  No aneurysm identified.  Tertiary vessels enhance symmetrically.  Impression: Patency of the major intracranial arteries without evidence of aneurysm or considerable stenosis.    Electronically signed by: Ayo Gibbs MD  Date:    01/18/2023  Time:    15:47  X-Ray Chest AP Portable  Narrative: EXAMINATION:  XR CHEST AP  PORTABLE    CLINICAL HISTORY:  Stroke;    COMPARISON:  03/16/2020    FINDINGS:  Cardiac silhouette is prominent, possibly accentuated by technique.  There is sternotomy change.  Thoracic aorta is calcified.  No focal infiltrate or pleural effusion.  There is gaseous distention of the stomach.  Impression: No evidence of an acute pulmonary process.    Electronically signed by: Ayo Gibbs MD  Date:    01/18/2023  Time:    14:49  CT Head Without Contrast  Narrative: EXAMINATION:  CT HEAD WITHOUT CONTRAST    CLINICAL HISTORY:  Neuro deficit, acute, stroke suspected;    CT/Cardiac Nuclear exams in prior 12 months: 0    TECHNIQUE:  Axial head CT performed without IV contrast.  Iterative reconstruction utilized.    COMPARISON:  CT head 03/26/2019    FINDINGS:  No evidence of an acute intracranial hemorrhage, infarct or mass effect.    Multifocal areas of encephalomalacia involving portions of left frontal, parietal lobes, as before.  Patchy areas of decreased white matter attenuation, similar.  Mild cerebral volume loss, not advanced for age.  ICA and vertebral calcifications.  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  Start taking on: January 22, 2023     cefdinir 300 MG capsule  Commonly known as: OMNICEF  Take 1 capsule (300 mg total) by mouth 2 (two) times daily. for 6 days     isosorbide-hydrALAZINE 20-37.5 mg 20-37.5 mg Tab  Commonly known as: BIDIL  Take 1 tablet by mouth 3 (three) times daily.     timolol maleate 0.5% 0.5 % Drop  Commonly known as: TIMOPTIC  Place 1 drop into both eyes 2 (two) times daily.        CONTINUE taking these  medications    aspirin 81 MG Chew  Take 81 mg by mouth once daily.     atorvastatin 40 MG tablet  Commonly known as: LIPITOR  Take 40 mg by mouth once daily.     b complex vitamins tablet  Take 1 tablet by mouth once daily.     insulin detemir U-100 100 unit/mL injection  Commonly known as: Levemir  Inject into the skin every evening.     irbesartan 300 MG tablet  Commonly known as: AVAPRO  Take 300 mg by mouth every evening.     metFORMIN 500 MG tablet  Commonly known as: GLUCOPHAGE  Take 500 mg by mouth 2 (two) times daily with meals.     metoprolol tartrate 25 MG tablet  Commonly known as: LOPRESSOR  Take 25 mg by mouth 2 (two) times daily.     naproxen 500 MG tablet  Commonly known as: NAPROSYN  Take 1 tablet (500 mg total) by mouth 2 (two) times daily with meals.     POLY-VENT DM 60- mg Tab  Generic drug: pseudoephedrine-DM-guaiFENesin  Take 1 tablet by mouth every 6 (six) hours as needed.     timoloL 0.25 % ophthalmic solution  1-2 drops 2 (two) times daily.            Indwelling Lines/Drains at time of discharge:   Lines/Drains/Airways     None                 Time spent on the discharge of patient: 35 minutes         Ml Niño DO  Department of Hospital Medicine  [facility]

## 2023-01-21 NOTE — PLAN OF CARE
Plan of care reviewed at bedside, verbalized understanding. Denies any need, call bell is in reach. Will continue to monitor.

## 2023-01-21 NOTE — ASSESSMENT & PLAN NOTE
Patient's FSGs are controlled on current medication regimen.  Last A1c reviewed-   Lab Results   Component Value Date    HGBA1C 6.7 (H) 01/18/2023     Most recent fingerstick glucose reviewed-   No results for input(s): POCTGLUCOSE in the last 24 hours.  Current correctional scale  High  Maintain anti-hyperglycemic dose as follows-   Antihyperglycemics (From admission, onward)    Start     Stop Route Frequency Ordered    01/18/23 1807  insulin aspart U-100 pen 0-5 Units         -- SubQ Before meals & nightly PRN 01/18/23 1707        Hold Oral hypoglycemics while patient is in the hospital.

## 2023-01-24 LAB
POCT GLUCOSE: 151 MG/DL (ref 70–110)
POCT GLUCOSE: 166 MG/DL (ref 70–110)
POCT GLUCOSE: 208 MG/DL (ref 70–110)
POCT GLUCOSE: 215 MG/DL (ref 70–110)
POCT GLUCOSE: 220 MG/DL (ref 70–110)
POCT GLUCOSE: 234 MG/DL (ref 70–110)
POCT GLUCOSE: 252 MG/DL (ref 70–110)

## 2023-09-06 ENCOUNTER — HOSPITAL ENCOUNTER (EMERGENCY)
Facility: HOSPITAL | Age: 78
Discharge: HOME | End: 2023-09-06
Attending: STUDENT IN AN ORGANIZED HEALTH CARE EDUCATION/TRAINING PROGRAM
Payer: MEDICARE

## 2023-09-06 VITALS
BODY MASS INDEX: 26.48 KG/M2 | SYSTOLIC BLOOD PRESSURE: 176 MMHG | RESPIRATION RATE: 14 BRPM | OXYGEN SATURATION: 99 % | WEIGHT: 185 LBS | HEIGHT: 70 IN | TEMPERATURE: 98 F | DIASTOLIC BLOOD PRESSURE: 82 MMHG | HEART RATE: 61 BPM
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
## 2023-09-06 DIAGNOSIS — S20.211A CONTUSION OF RIB ON RIGHT SIDE, INITIAL ENCOUNTER: ICD-10-CM

## 2023-09-06 PROCEDURE — 99284 EMERGENCY DEPT VISIT MOD MDM: CPT | Mod: 25

## 2023-09-06 PROCEDURE — 25000003 PHARM REV CODE 250: Performed by: STUDENT IN AN ORGANIZED HEALTH CARE EDUCATION/TRAINING PROGRAM

## 2023-09-06 RX ORDER — TRAMADOL HYDROCHLORIDE 50 MG/1
50 TABLET ORAL
Status: COMPLETED | OUTPATIENT
Start: 2023-09-06 | End: 2023-09-06

## 2023-09-06 RX ORDER — LIDOCAINE 50 MG/G
1 PATCH TOPICAL
Status: DISCONTINUED | OUTPATIENT
Start: 2023-09-06 | End: 2023-09-06 | Stop reason: HOSPADM

## 2023-09-06 RX ORDER — LIDOCAINE 50 MG/G
1 PATCH TOPICAL DAILY
Qty: 10 PATCH | Refills: 0 | Status: SHIPPED | OUTPATIENT
Start: 2023-09-06 | End: 2023-09-16

## 2023-09-06 RX ADMIN — TRAMADOL HYDROCHLORIDE 50 MG: 50 TABLET, COATED ORAL at 07:09

## 2023-09-06 RX ADMIN — LIDOCAINE 1 PATCH: 50 PATCH TOPICAL at 07:09

## 2023-09-07 NOTE — ED PROVIDER NOTES
"  History  Chief Complaint   Patient presents with    Fall     Frequent falls x 2 - 3 days, today fell and hit back of head. Denies LOC. Report right rib pain, right abrasion to elbow. States has been feeling off balance recently     78-year-old male history of diabetes, hypertension, and CVA 7 years ago presents for evaluation of right rib pain.  Grandson notes patient has had frequent falls/gait instability over the past 7 years due to history of prior CVA.  2-3 days ago patient lost his balance and fell onto his right rib causing pain.  Today patient lost his balance going down the steps and hit the back of his head.  Patient came in for evaluation due to worsening right-sided rib pain.  All other systems reviewed and noted to be negative.        Past Medical History:   Diagnosis Date    Diabetes mellitus type I     Hypertension     Stroke        History reviewed. No pertinent surgical history.    History reviewed. No pertinent family history.    Social History     Tobacco Use    Smoking status: Never    Smokeless tobacco: Never   Substance Use Topics    Alcohol use: Not Currently    Drug use: Never       ROS  Review of Systems   Cardiovascular:  Positive for chest pain (rib pain).       Physical Exam  BP (!) 176/82   Pulse 61   Temp 98.2 °F (36.8 °C)   Resp 14   Ht 5' 10" (1.778 m)   Wt 83.9 kg (185 lb)   SpO2 99%   BMI 26.54 kg/m²   Physical Exam    Constitutional: He appears well-developed and well-nourished. He is cooperative.   HENT:   Head: Normocephalic and atraumatic.   Eyes: Conjunctivae, EOM and lids are normal. Pupils are equal, round, and reactive to light.   Neck: Phonation normal.   Normal range of motion.  Cardiovascular:  Normal rate, regular rhythm and intact distal pulses.           Pulmonary/Chest: No stridor. He exhibits bony tenderness.     Abdominal: Abdomen is soft. There is no abdominal tenderness.   Musculoskeletal:      Cervical back: Normal range of motion.     Neurological: He " is alert and oriented to person, place, and time.   Skin: Skin is warm and dry.   Psychiatric: He has a normal mood and affect. His speech is normal and behavior is normal.               Labs Reviewed - No data to display                      Procedures             Medical Decision Making  78-year-old male history of diabetes, hypertension, and CVA 7 years ago presents for evaluation of right rib pain.  Grandson notes patient has had frequent falls/gait instability over the past 7 years due to history of prior CVA.  2-3 days ago patient lost his balance and fell onto his right rib causing pain.  Today patient lost his balance going down the steps and hit the back of his head.  Patient came in for evaluation due to worsening right-sided rib pain.  All other systems reviewed and noted to be negative.  Physical exam notable for tenderness to palpation along the right lower ribcage.  Will obtain CT for further evaluation.  See ED course for updates    Amount and/or Complexity of Data Reviewed  Radiology: ordered.    Risk  Prescription drug management.               ED Course as of 09/08/23 0129   Wed Sep 06, 2023   1941 Ct head negative. [NA]   2002 CT chest negative.  Pt with likely musculoskeletal pain, will discharge with prescription for lidocaine patches and advice to f/u with PMD prn [NA]      ED Course User Index  [NA] Maribel Champagne MD       Clinical Impression  The primary encounter diagnosis was Fall, initial encounter. A diagnosis of Contusion of rib on right side, initial encounter was also pertinent to this visit.       Maribel Champagne MD  09/08/23 0129

## 2024-10-24 ENCOUNTER — HOSPITAL ENCOUNTER (OUTPATIENT)
Dept: RADIOLOGY | Facility: HOSPITAL | Age: 79
Discharge: HOME | End: 2024-10-24
Attending: INTERNAL MEDICINE
Payer: MEDICARE

## 2024-10-24 DIAGNOSIS — S63.641A SPRAIN OF METACARPOPHALANGEAL JOINT OF RIGHT THUMB: ICD-10-CM

## 2024-10-24 DIAGNOSIS — S80.02XA CONTUSION OF LEFT KNEE: ICD-10-CM

## 2024-10-24 DIAGNOSIS — S63.641A SPRAIN OF METACARPOPHALANGEAL JOINT OF RIGHT THUMB: Primary | ICD-10-CM

## 2024-10-24 DIAGNOSIS — S80.02XA CONTUSION OF LEFT KNEE: Primary | ICD-10-CM

## 2024-10-24 PROCEDURE — 73562 X-RAY EXAM OF KNEE 3: CPT | Mod: TC,LT

## 2024-10-24 PROCEDURE — 73130 X-RAY EXAM OF HAND: CPT | Mod: TC,RT

## 2025-02-06 ENCOUNTER — LAB VISIT (OUTPATIENT)
Dept: LAB | Facility: HOSPITAL | Age: 80
End: 2025-02-06
Attending: INTERNAL MEDICINE
Payer: MEDICARE

## 2025-02-06 DIAGNOSIS — R60.9 EDEMA: ICD-10-CM

## 2025-02-06 DIAGNOSIS — I13.10 HYPERTENSIVE HEART AND RENAL DISEASE: ICD-10-CM

## 2025-02-06 DIAGNOSIS — I50.22 CHRONIC SYSTOLIC HEART FAILURE: ICD-10-CM

## 2025-02-06 DIAGNOSIS — E11.9 DIABETES MELLITUS WITHOUT COMPLICATION: ICD-10-CM

## 2025-02-06 DIAGNOSIS — Z15.89 DEFICIENCY OF DNA REPAIR: ICD-10-CM

## 2025-02-06 DIAGNOSIS — N39.0 URINARY TRACT INFECTION, SITE NOT SPECIFIED: ICD-10-CM

## 2025-02-06 DIAGNOSIS — N18.31 CHRONIC KIDNEY DISEASE (CKD) STAGE G3A/A1, MODERATELY DECREASED GLOMERULAR FILTRATION RATE (GFR) BETWEEN 45-59 ML/MIN/1.73 SQUARE METER AND ALBUMINURIA CREATININE RATIO LESS THAN 30 MG/G: ICD-10-CM

## 2025-02-06 LAB
ALBUMIN SERPL BCP-MCNC: 3.3 G/DL (ref 3.5–5.2)
ALP SERPL-CCNC: 53 U/L (ref 55–135)
ALT SERPL W/O P-5'-P-CCNC: 22 U/L (ref 10–44)
ANION GAP SERPL CALC-SCNC: 6 MMOL/L (ref 8–16)
APTT PPP: 28.2 SEC (ref 21–32)
AST SERPL-CCNC: 33 U/L (ref 10–40)
BACTERIA #/AREA URNS HPF: ABNORMAL /HPF
BASOPHILS # BLD AUTO: 0.02 K/UL (ref 0–0.2)
BASOPHILS NFR BLD: 0.4 % (ref 0–1.9)
BILIRUB SERPL-MCNC: 0.9 MG/DL (ref 0.1–1)
BILIRUB UR QL STRIP: NEGATIVE
BUN SERPL-MCNC: 31 MG/DL (ref 8–23)
CALCIUM SERPL-MCNC: 8.7 MG/DL (ref 8.7–10.5)
CHLORIDE SERPL-SCNC: 107 MMOL/L (ref 95–110)
CLARITY UR: CLEAR
CO2 SERPL-SCNC: 27 MMOL/L (ref 23–29)
COLOR UR: YELLOW
CREAT SERPL-MCNC: 2.8 MG/DL (ref 0.5–1.4)
DIFFERENTIAL METHOD BLD: ABNORMAL
EOSINOPHIL # BLD AUTO: 0.1 K/UL (ref 0–0.5)
EOSINOPHIL NFR BLD: 1 % (ref 0–8)
ERYTHROCYTE [DISTWIDTH] IN BLOOD BY AUTOMATED COUNT: 15 % (ref 11.5–14.5)
EST. GFR  (NO RACE VARIABLE): 22.4 ML/MIN/1.73 M^2
GLUCOSE SERPL-MCNC: 143 MG/DL (ref 70–110)
GLUCOSE UR QL STRIP: NEGATIVE
HCT VFR BLD AUTO: 31.8 % (ref 40–54)
HGB BLD-MCNC: 10.2 G/DL (ref 14–18)
HGB UR QL STRIP: NEGATIVE
HYALINE CASTS #/AREA URNS LPF: 1.5 /LPF
IMM GRANULOCYTES # BLD AUTO: 0.02 K/UL (ref 0–0.04)
IMM GRANULOCYTES NFR BLD AUTO: 0.4 % (ref 0–0.5)
INR PPP: 1.1 (ref 0.8–1.2)
KETONES UR QL STRIP: NEGATIVE
LEUKOCYTE ESTERASE UR QL STRIP: ABNORMAL
LYMPHOCYTES # BLD AUTO: 1.1 K/UL (ref 1–4.8)
LYMPHOCYTES NFR BLD: 23.1 % (ref 18–48)
MCH RBC QN AUTO: 30.7 PG (ref 27–31)
MCHC RBC AUTO-ENTMCNC: 32.1 G/DL (ref 32–36)
MCV RBC AUTO: 96 FL (ref 82–98)
MICROSCOPIC COMMENT: ABNORMAL
MONOCYTES # BLD AUTO: 0.5 K/UL (ref 0.3–1)
MONOCYTES NFR BLD: 10.9 % (ref 4–15)
NEUTROPHILS # BLD AUTO: 3.1 K/UL (ref 1.8–7.7)
NEUTROPHILS NFR BLD: 64.2 % (ref 38–73)
NITRITE UR QL STRIP: NEGATIVE
NRBC BLD-RTO: 0 /100 WBC
PH UR STRIP: 6 [PH] (ref 5–8)
PLATELET # BLD AUTO: 115 K/UL (ref 150–450)
PMV BLD AUTO: 11.7 FL (ref 9.2–12.9)
POTASSIUM SERPL-SCNC: 5 MMOL/L (ref 3.5–5.1)
PROT SERPL-MCNC: 6.7 G/DL (ref 6–8.4)
PROT UR QL STRIP: NEGATIVE
PROTHROMBIN TIME: 11.9 SEC (ref 9–12.5)
RBC # BLD AUTO: 3.32 M/UL (ref 4.6–6.2)
RBC #/AREA URNS HPF: 1 /HPF (ref 0–4)
SODIUM SERPL-SCNC: 140 MMOL/L (ref 136–145)
SP GR UR STRIP: 1.02 (ref 1–1.03)
SQUAMOUS #/AREA URNS HPF: 2 /HPF
URN SPEC COLLECT METH UR: ABNORMAL
UROBILINOGEN UR STRIP-ACNC: 1 EU/DL
WBC # BLD AUTO: 4.85 K/UL (ref 3.9–12.7)
WBC #/AREA URNS HPF: 3 /HPF (ref 0–5)

## 2025-02-06 PROCEDURE — 85730 THROMBOPLASTIN TIME PARTIAL: CPT | Performed by: INTERNAL MEDICINE

## 2025-02-06 PROCEDURE — 80053 COMPREHEN METABOLIC PANEL: CPT | Performed by: INTERNAL MEDICINE

## 2025-02-06 PROCEDURE — 81000 URINALYSIS NONAUTO W/SCOPE: CPT | Performed by: INTERNAL MEDICINE

## 2025-02-06 PROCEDURE — 85610 PROTHROMBIN TIME: CPT | Performed by: INTERNAL MEDICINE

## 2025-02-06 PROCEDURE — 36415 COLL VENOUS BLD VENIPUNCTURE: CPT | Performed by: INTERNAL MEDICINE

## 2025-02-06 PROCEDURE — 85025 COMPLETE CBC W/AUTO DIFF WBC: CPT | Performed by: INTERNAL MEDICINE
